# Patient Record
Sex: MALE | Race: WHITE | NOT HISPANIC OR LATINO | Employment: OTHER | URBAN - METROPOLITAN AREA
[De-identification: names, ages, dates, MRNs, and addresses within clinical notes are randomized per-mention and may not be internally consistent; named-entity substitution may affect disease eponyms.]

---

## 2023-10-18 ENCOUNTER — OFFICE VISIT (OUTPATIENT)
Dept: URGENT CARE | Facility: CLINIC | Age: 43
End: 2023-10-18
Payer: OTHER GOVERNMENT

## 2023-10-18 ENCOUNTER — OCCUPATIONAL HEALTH (OUTPATIENT)
Dept: URGENT CARE | Facility: CLINIC | Age: 43
End: 2023-10-18
Payer: OTHER GOVERNMENT

## 2023-10-18 VITALS
DIASTOLIC BLOOD PRESSURE: 84 MMHG | OXYGEN SATURATION: 97 % | BODY MASS INDEX: 36.26 KG/M2 | HEART RATE: 92 BPM | SYSTOLIC BLOOD PRESSURE: 138 MMHG | WEIGHT: 259 LBS | RESPIRATION RATE: 18 BRPM | HEIGHT: 71 IN | TEMPERATURE: 97 F

## 2023-10-18 DIAGNOSIS — J02.0 STREP PHARYNGITIS: Primary | ICD-10-CM

## 2023-10-18 DIAGNOSIS — Z00.00 ENCOUNTER FOR PHYSICAL EXAMINATION: Primary | ICD-10-CM

## 2023-10-18 DIAGNOSIS — J02.9 SORE THROAT: ICD-10-CM

## 2023-10-18 LAB
COLLECTION ONLY: NORMAL
CTP QC/QA: YES
CTP QC/QA: YES
S PYO RRNA THROAT QL PROBE: POSITIVE
SARS-COV-2 AG RESP QL IA.RAPID: NEGATIVE

## 2023-10-18 PROCEDURE — 87811 SARS-COV-2 COVID19 W/OPTIC: CPT | Mod: QW,S$GLB,, | Performed by: NURSE PRACTITIONER

## 2023-10-18 PROCEDURE — 99499 UNLISTED E&M SERVICE: CPT | Mod: S$GLB,,, | Performed by: NURSE PRACTITIONER

## 2023-10-18 PROCEDURE — 87880 POCT RAPID STREP A: ICD-10-PCS | Mod: QW,,, | Performed by: NURSE PRACTITIONER

## 2023-10-18 PROCEDURE — 87880 STREP A ASSAY W/OPTIC: CPT | Mod: QW,,, | Performed by: NURSE PRACTITIONER

## 2023-10-18 PROCEDURE — 99499 DOT PHYSICAL: ICD-10-PCS | Mod: S$GLB,,, | Performed by: NURSE PRACTITIONER

## 2023-10-18 PROCEDURE — 99204 OFFICE O/P NEW MOD 45 MIN: CPT | Mod: S$GLB,,, | Performed by: NURSE PRACTITIONER

## 2023-10-18 PROCEDURE — 87811 SARS CORONAVIRUS 2 ANTIGEN POCT, MANUAL READ: ICD-10-PCS | Mod: QW,S$GLB,, | Performed by: NURSE PRACTITIONER

## 2023-10-18 PROCEDURE — 99204 PR OFFICE/OUTPT VISIT, NEW, LEVL IV, 45-59 MIN: ICD-10-PCS | Mod: S$GLB,,, | Performed by: NURSE PRACTITIONER

## 2023-10-18 RX ORDER — PENICILLIN V POTASSIUM 500 MG/1
500 TABLET, FILM COATED ORAL EVERY 12 HOURS
Qty: 20 TABLET | Refills: 0 | Status: SHIPPED | OUTPATIENT
Start: 2023-10-18 | End: 2023-10-28

## 2023-10-18 NOTE — PROGRESS NOTES
"Subjective:      Patient ID: Max Flores is a 43 y.o. male.    Vitals:  height is 5' 11" (1.803 m) and weight is 117.5 kg (259 lb). His oral temperature is 97.1 °F (36.2 °C). His blood pressure is 138/84 and his pulse is 92. His respiration is 18 and oxygen saturation is 97%.     Chief Complaint: Sore Throat    Sore Throat   The current episode started yesterday. There has been no fever. The pain is at a severity of 7/10. The pain is moderate. Associated symptoms include congestion and a hoarse voice. Pertinent negatives include no abdominal pain, coughing, diarrhea, drooling, ear discharge, ear pain, headaches, plugged ear sensation, neck pain, shortness of breath, stridor, swollen glands, trouble swallowing or vomiting. He has had exposure to strep. He has had no exposure to mono. He has tried nothing for the symptoms. The treatment provided no relief.       HENT:  Positive for congestion and sore throat. Negative for ear pain, ear discharge, drooling and trouble swallowing.    Neck: Negative for neck pain.   Respiratory:  Negative for cough, shortness of breath and stridor.    Gastrointestinal:  Negative for abdominal pain, vomiting and diarrhea.   Neurological:  Negative for headaches.      Objective:     Physical Exam   Constitutional:  Non-toxic appearance. He does not appear ill. No distress.   HENT:   Head: Normocephalic and atraumatic.   Ears:   Right Ear: External ear normal.   Left Ear: External ear normal.   Mouth/Throat: Uvula is midline. Mucous membranes are moist. No uvula swelling. Posterior oropharyngeal erythema present. Tonsils are 0 on the right. Tonsils are 0 on the left. Oropharynx is clear.   No airway obstruction  No drooling or pooling of secretion      Comments: No airway obstruction  No drooling or pooling of secretion  Eyes: Extraocular movement intact   Pulmonary/Chest: Effort normal.   Abdominal: Normal appearance.   Neurological: He is alert.   Skin: Skin is not diaphoretic. " Capillary refill takes less than 2 seconds.   Nursing note and vitals reviewed.    Results for orders placed or performed in visit on 10/18/23   SARS Coronavirus 2 Antigen, POCT Manual Read   Result Value Ref Range    SARS Coronavirus 2 Antigen Negative Negative     Acceptable Yes    POCT rapid strep A   Result Value Ref Range    Rapid Strep A Screen Positive (A) Negative     Acceptable Yes       Assessment:           1. Strep pharyngitis    2. Sore throat        Plan:   Strep A positive   Covid negative  Antibiotics prescribed; take as discussed  Ibuprofen/Tylenol for pain  Strep pharyngitis  -     penicillin v potassium (VEETID) 500 MG tablet; Take 1 tablet (500 mg total) by mouth every 12 (twelve) hours. for 10 days  Dispense: 20 tablet; Refill: 0    Sore throat  -     SARS Coronavirus 2 Antigen, POCT Manual Read  -     POCT rapid strep A                Patient Instructions                                                         Pharyngitis   If your condition worsens or fails to improve we recommend that you receive another evaluation at the ER immediately or contact your PCP to discuss your concerns or return here. You must understand that you've received an urgent care treatment only and that you may be released before all your medical problems are known or treated. You the patient will arrange for followup care as instructed.   The majority of all sore throats or tonsillitis are viral and antibiotics will not treat this.     If the strep test performed in office was Positive:  - Complete the full course of antibiotics given  - Drink plenty of cool liquids while avoid any beverage or food that can irritate your            throat (acidic, spicy or salty foods).  - Throw away your toothbrush now and when you complete your antibiotics.    If the strep culture was done and returns negative in 3-5 days and you are still having a sore throat, you may need to get a mono spot test done or  repeated.   Tylenol or ibuprofen for pain may help as long as you are not allergic to these meds or have a medical condition such as stomach ulcers, liver or kidney disease or taking blood thinners etc that would   prevent you from using these medications.   Rest and fluids will help as well.   If you were prescribed antibiotics and are female and on BCP use additional methods to prevent pregnancy while on the antibiotics and for one cycle after.

## 2023-12-04 ENCOUNTER — ANESTHESIA (OUTPATIENT)
Dept: SURGERY | Facility: HOSPITAL | Age: 43
End: 2023-12-04
Payer: OTHER GOVERNMENT

## 2023-12-04 ENCOUNTER — HOSPITAL ENCOUNTER (OUTPATIENT)
Facility: HOSPITAL | Age: 43
Discharge: HOME | End: 2023-12-05
Attending: EMERGENCY MEDICINE | Admitting: INTERNAL MEDICINE
Payer: OTHER GOVERNMENT

## 2023-12-04 ENCOUNTER — ANESTHESIA EVENT (OUTPATIENT)
Dept: SURGERY | Facility: HOSPITAL | Age: 43
End: 2023-12-04
Payer: OTHER GOVERNMENT

## 2023-12-04 ENCOUNTER — TELEPHONE (OUTPATIENT)
Dept: BARIATRICS | Facility: CLINIC | Age: 43
End: 2023-12-04
Payer: OTHER GOVERNMENT

## 2023-12-04 DIAGNOSIS — R10.11 RIGHT UPPER QUADRANT ABDOMINAL PAIN: ICD-10-CM

## 2023-12-04 DIAGNOSIS — R10.13 EPIGASTRIC PAIN: Primary | ICD-10-CM

## 2023-12-04 DIAGNOSIS — K80.00 CALCULUS OF GALLBLADDER WITH ACUTE CHOLECYSTITIS WITHOUT OBSTRUCTION: ICD-10-CM

## 2023-12-04 DIAGNOSIS — K80.20 CHOLELITHIASIS: ICD-10-CM

## 2023-12-04 DIAGNOSIS — R07.9 CHEST PAIN: ICD-10-CM

## 2023-12-04 PROBLEM — E78.5 HYPERLIPIDEMIA: Status: ACTIVE | Noted: 2023-12-04

## 2023-12-04 LAB
ALBUMIN SERPL BCP-MCNC: 4.1 G/DL (ref 3.5–5.2)
ALP SERPL-CCNC: 38 U/L (ref 55–135)
ALT SERPL W/O P-5'-P-CCNC: 49 U/L (ref 10–44)
ANION GAP SERPL CALC-SCNC: 12 MMOL/L (ref 8–16)
AST SERPL-CCNC: 31 U/L (ref 10–40)
BASOPHILS # BLD AUTO: 0.06 K/UL (ref 0–0.2)
BASOPHILS NFR BLD: 0.5 % (ref 0–1.9)
BILIRUB SERPL-MCNC: 0.4 MG/DL (ref 0.1–1)
BILIRUB UR QL STRIP: NEGATIVE
BUN SERPL-MCNC: 12 MG/DL (ref 6–20)
CALCIUM SERPL-MCNC: 8.9 MG/DL (ref 8.7–10.5)
CHLORIDE SERPL-SCNC: 101 MMOL/L (ref 95–110)
CLARITY UR: ABNORMAL
CO2 SERPL-SCNC: 26 MMOL/L (ref 23–29)
COLOR UR: YELLOW
CREAT SERPL-MCNC: 1 MG/DL (ref 0.5–1.4)
DIFFERENTIAL METHOD: ABNORMAL
EOSINOPHIL # BLD AUTO: 0.1 K/UL (ref 0–0.5)
EOSINOPHIL NFR BLD: 0.8 % (ref 0–8)
ERYTHROCYTE [DISTWIDTH] IN BLOOD BY AUTOMATED COUNT: 13 % (ref 11.5–14.5)
EST. GFR  (NO RACE VARIABLE): >60 ML/MIN/1.73 M^2
GLUCOSE SERPL-MCNC: 153 MG/DL (ref 70–110)
GLUCOSE UR QL STRIP: ABNORMAL
HCT VFR BLD AUTO: 43.2 % (ref 40–54)
HGB BLD-MCNC: 14.8 G/DL (ref 14–18)
HGB UR QL STRIP: NEGATIVE
IMM GRANULOCYTES # BLD AUTO: 0.06 K/UL (ref 0–0.04)
IMM GRANULOCYTES NFR BLD AUTO: 0.5 % (ref 0–0.5)
KETONES UR QL STRIP: NEGATIVE
LEUKOCYTE ESTERASE UR QL STRIP: NEGATIVE
LIPASE SERPL-CCNC: 19 U/L (ref 4–60)
LYMPHOCYTES # BLD AUTO: 2 K/UL (ref 1–4.8)
LYMPHOCYTES NFR BLD: 18.2 % (ref 18–48)
MCH RBC QN AUTO: 30.3 PG (ref 27–31)
MCHC RBC AUTO-ENTMCNC: 34.3 G/DL (ref 32–36)
MCV RBC AUTO: 88 FL (ref 82–98)
MONOCYTES # BLD AUTO: 0.9 K/UL (ref 0.3–1)
MONOCYTES NFR BLD: 8.2 % (ref 4–15)
NEUTROPHILS # BLD AUTO: 8 K/UL (ref 1.8–7.7)
NEUTROPHILS NFR BLD: 71.8 % (ref 38–73)
NITRITE UR QL STRIP: NEGATIVE
NRBC BLD-RTO: 0 /100 WBC
PH UR STRIP: 7 [PH] (ref 5–8)
PLATELET # BLD AUTO: 253 K/UL (ref 150–450)
PMV BLD AUTO: 8.9 FL (ref 9.2–12.9)
POTASSIUM SERPL-SCNC: 3.6 MMOL/L (ref 3.5–5.1)
PROT SERPL-MCNC: 7.2 G/DL (ref 6–8.4)
PROT UR QL STRIP: NEGATIVE
RBC # BLD AUTO: 4.89 M/UL (ref 4.6–6.2)
SODIUM SERPL-SCNC: 139 MMOL/L (ref 136–145)
SP GR UR STRIP: 1.02 (ref 1–1.03)
URN SPEC COLLECT METH UR: ABNORMAL
UROBILINOGEN UR STRIP-ACNC: NEGATIVE EU/DL
WBC # BLD AUTO: 11.12 K/UL (ref 3.9–12.7)

## 2023-12-04 PROCEDURE — 37000009 HC ANESTHESIA EA ADD 15 MINS: Performed by: SURGERY

## 2023-12-04 PROCEDURE — 71000033 HC RECOVERY, INTIAL HOUR: Performed by: SURGERY

## 2023-12-04 PROCEDURE — 96375 TX/PRO/DX INJ NEW DRUG ADDON: CPT

## 2023-12-04 PROCEDURE — 81003 URINALYSIS AUTO W/O SCOPE: CPT | Performed by: EMERGENCY MEDICINE

## 2023-12-04 PROCEDURE — 27000221 HC OXYGEN, UP TO 24 HOURS

## 2023-12-04 PROCEDURE — 63600175 PHARM REV CODE 636 W HCPCS: Performed by: SURGERY

## 2023-12-04 PROCEDURE — 96365 THER/PROPH/DIAG IV INF INIT: CPT | Mod: 59

## 2023-12-04 PROCEDURE — 96376 TX/PRO/DX INJ SAME DRUG ADON: CPT | Mod: 59

## 2023-12-04 PROCEDURE — 94799 UNLISTED PULMONARY SVC/PX: CPT | Mod: XB

## 2023-12-04 PROCEDURE — 63600175 PHARM REV CODE 636 W HCPCS: Performed by: NURSE ANESTHETIST, CERTIFIED REGISTERED

## 2023-12-04 PROCEDURE — 99285 EMERGENCY DEPT VISIT HI MDM: CPT | Mod: 25

## 2023-12-04 PROCEDURE — 36415 COLL VENOUS BLD VENIPUNCTURE: CPT | Performed by: EMERGENCY MEDICINE

## 2023-12-04 PROCEDURE — 99205 PR OFFICE/OUTPT VISIT, NEW, LEVL V, 60-74 MIN: ICD-10-PCS | Mod: 57,,, | Performed by: SURGERY

## 2023-12-04 PROCEDURE — 63600175 PHARM REV CODE 636 W HCPCS: Performed by: NURSE PRACTITIONER

## 2023-12-04 PROCEDURE — 63600175 PHARM REV CODE 636 W HCPCS

## 2023-12-04 PROCEDURE — C9113 INJ PANTOPRAZOLE SODIUM, VIA: HCPCS | Performed by: NURSE PRACTITIONER

## 2023-12-04 PROCEDURE — 25000003 PHARM REV CODE 250: Performed by: NURSE ANESTHETIST, CERTIFIED REGISTERED

## 2023-12-04 PROCEDURE — 99205 OFFICE O/P NEW HI 60 MIN: CPT | Mod: 57,,, | Performed by: SURGERY

## 2023-12-04 PROCEDURE — 94761 N-INVAS EAR/PLS OXIMETRY MLT: CPT

## 2023-12-04 PROCEDURE — D9220A PRA ANESTHESIA: ICD-10-PCS | Mod: CRNA,,, | Performed by: NURSE ANESTHETIST, CERTIFIED REGISTERED

## 2023-12-04 PROCEDURE — 36000708 HC OR TIME LEV III 1ST 15 MIN: Performed by: SURGERY

## 2023-12-04 PROCEDURE — 25000003 PHARM REV CODE 250

## 2023-12-04 PROCEDURE — 27201423 OPTIME MED/SURG SUP & DEVICES STERILE SUPPLY: Performed by: SURGERY

## 2023-12-04 PROCEDURE — 25000003 PHARM REV CODE 250: Performed by: SURGERY

## 2023-12-04 PROCEDURE — 25000003 PHARM REV CODE 250: Performed by: ANESTHESIOLOGY

## 2023-12-04 PROCEDURE — 80053 COMPREHEN METABOLIC PANEL: CPT | Performed by: EMERGENCY MEDICINE

## 2023-12-04 PROCEDURE — 36000709 HC OR TIME LEV III EA ADD 15 MIN: Performed by: SURGERY

## 2023-12-04 PROCEDURE — D9220A PRA ANESTHESIA: ICD-10-PCS | Mod: ANES,,, | Performed by: ANESTHESIOLOGY

## 2023-12-04 PROCEDURE — G0378 HOSPITAL OBSERVATION PER HR: HCPCS

## 2023-12-04 PROCEDURE — 96375 TX/PRO/DX INJ NEW DRUG ADDON: CPT | Mod: 59

## 2023-12-04 PROCEDURE — 85025 COMPLETE CBC W/AUTO DIFF WBC: CPT | Performed by: EMERGENCY MEDICINE

## 2023-12-04 PROCEDURE — 96366 THER/PROPH/DIAG IV INF ADDON: CPT | Mod: 59

## 2023-12-04 PROCEDURE — 47562 LAPAROSCOPIC CHOLECYSTECTOMY: CPT | Mod: ,,, | Performed by: SURGERY

## 2023-12-04 PROCEDURE — 63600175 PHARM REV CODE 636 W HCPCS: Performed by: EMERGENCY MEDICINE

## 2023-12-04 PROCEDURE — D9220A PRA ANESTHESIA: Mod: CRNA,,, | Performed by: NURSE ANESTHETIST, CERTIFIED REGISTERED

## 2023-12-04 PROCEDURE — 47562 PR LAP,CHOLECYSTECTOMY: ICD-10-PCS | Mod: ,,, | Performed by: SURGERY

## 2023-12-04 PROCEDURE — 63600175 PHARM REV CODE 636 W HCPCS: Performed by: ANESTHESIOLOGY

## 2023-12-04 PROCEDURE — 83690 ASSAY OF LIPASE: CPT | Performed by: EMERGENCY MEDICINE

## 2023-12-04 PROCEDURE — D9220A PRA ANESTHESIA: Mod: ANES,,, | Performed by: ANESTHESIOLOGY

## 2023-12-04 PROCEDURE — 25500020 PHARM REV CODE 255

## 2023-12-04 PROCEDURE — 37000008 HC ANESTHESIA 1ST 15 MINUTES: Performed by: SURGERY

## 2023-12-04 RX ORDER — HYDROCODONE BITARTRATE AND ACETAMINOPHEN 5; 325 MG/1; MG/1
2 TABLET ORAL EVERY 6 HOURS PRN
Status: DISCONTINUED | OUTPATIENT
Start: 2023-12-04 | End: 2023-12-05 | Stop reason: HOSPADM

## 2023-12-04 RX ORDER — FENTANYL CITRATE 50 UG/ML
25 INJECTION, SOLUTION INTRAMUSCULAR; INTRAVENOUS EVERY 5 MIN PRN
Status: DISCONTINUED | OUTPATIENT
Start: 2023-12-04 | End: 2023-12-04 | Stop reason: HOSPADM

## 2023-12-04 RX ORDER — HYDROMORPHONE HYDROCHLORIDE 1 MG/ML
1 INJECTION, SOLUTION INTRAMUSCULAR; INTRAVENOUS; SUBCUTANEOUS EVERY 4 HOURS PRN
Status: DISCONTINUED | OUTPATIENT
Start: 2023-12-04 | End: 2023-12-04

## 2023-12-04 RX ORDER — OXYCODONE HYDROCHLORIDE 5 MG/1
5 TABLET ORAL
Status: DISCONTINUED | OUTPATIENT
Start: 2023-12-04 | End: 2023-12-04 | Stop reason: HOSPADM

## 2023-12-04 RX ORDER — LIDOCAINE HYDROCHLORIDE 20 MG/ML
INJECTION INTRAVENOUS
Status: DISCONTINUED | OUTPATIENT
Start: 2023-12-04 | End: 2023-12-04

## 2023-12-04 RX ORDER — LANOLIN ALCOHOL/MO/W.PET/CERES
800 CREAM (GRAM) TOPICAL
Status: DISCONTINUED | OUTPATIENT
Start: 2023-12-04 | End: 2023-12-05 | Stop reason: HOSPADM

## 2023-12-04 RX ORDER — HYDROMORPHONE HYDROCHLORIDE 1 MG/ML
0.5 INJECTION, SOLUTION INTRAMUSCULAR; INTRAVENOUS; SUBCUTANEOUS EVERY 4 HOURS PRN
Status: DISCONTINUED | OUTPATIENT
Start: 2023-12-04 | End: 2023-12-04

## 2023-12-04 RX ORDER — FENTANYL CITRATE 50 UG/ML
INJECTION, SOLUTION INTRAMUSCULAR; INTRAVENOUS
Status: DISCONTINUED | OUTPATIENT
Start: 2023-12-04 | End: 2023-12-04

## 2023-12-04 RX ORDER — SODIUM CHLORIDE 0.9 % (FLUSH) 0.9 %
3 SYRINGE (ML) INJECTION EVERY 12 HOURS PRN
Status: DISCONTINUED | OUTPATIENT
Start: 2023-12-04 | End: 2023-12-05 | Stop reason: HOSPADM

## 2023-12-04 RX ORDER — DROPERIDOL 2.5 MG/ML
1.25 INJECTION, SOLUTION INTRAMUSCULAR; INTRAVENOUS
Status: DISCONTINUED | OUTPATIENT
Start: 2023-12-04 | End: 2023-12-04

## 2023-12-04 RX ORDER — SUCCINYLCHOLINE CHLORIDE 20 MG/ML
INJECTION INTRAMUSCULAR; INTRAVENOUS
Status: DISCONTINUED | OUTPATIENT
Start: 2023-12-04 | End: 2023-12-04

## 2023-12-04 RX ORDER — TALC
9 POWDER (GRAM) TOPICAL NIGHTLY PRN
Status: DISCONTINUED | OUTPATIENT
Start: 2023-12-04 | End: 2023-12-05 | Stop reason: HOSPADM

## 2023-12-04 RX ORDER — HYDROMORPHONE HYDROCHLORIDE 1 MG/ML
0.5 INJECTION, SOLUTION INTRAMUSCULAR; INTRAVENOUS; SUBCUTANEOUS
Status: COMPLETED | OUTPATIENT
Start: 2023-12-04 | End: 2023-12-04

## 2023-12-04 RX ORDER — DROPERIDOL 2.5 MG/ML
2.5 INJECTION, SOLUTION INTRAMUSCULAR; INTRAVENOUS
Status: DISCONTINUED | OUTPATIENT
Start: 2023-12-04 | End: 2023-12-04

## 2023-12-04 RX ORDER — ONDANSETRON 2 MG/ML
INJECTION INTRAMUSCULAR; INTRAVENOUS
Status: DISCONTINUED | OUTPATIENT
Start: 2023-12-04 | End: 2023-12-04

## 2023-12-04 RX ORDER — ENOXAPARIN SODIUM 100 MG/ML
40 INJECTION SUBCUTANEOUS EVERY 24 HOURS
Status: DISCONTINUED | OUTPATIENT
Start: 2023-12-04 | End: 2023-12-04

## 2023-12-04 RX ORDER — MAG HYDROX/ALUMINUM HYD/SIMETH 200-200-20
30 SUSPENSION, ORAL (FINAL DOSE FORM) ORAL 4 TIMES DAILY PRN
Status: DISCONTINUED | OUTPATIENT
Start: 2023-12-04 | End: 2023-12-05 | Stop reason: HOSPADM

## 2023-12-04 RX ORDER — PROPOFOL 10 MG/ML
VIAL (ML) INTRAVENOUS
Status: DISCONTINUED | OUTPATIENT
Start: 2023-12-04 | End: 2023-12-04

## 2023-12-04 RX ORDER — IBUPROFEN 200 MG
24 TABLET ORAL
Status: DISCONTINUED | OUTPATIENT
Start: 2023-12-04 | End: 2023-12-05 | Stop reason: HOSPADM

## 2023-12-04 RX ORDER — HYDROMORPHONE HYDROCHLORIDE 1 MG/ML
1 INJECTION, SOLUTION INTRAMUSCULAR; INTRAVENOUS; SUBCUTANEOUS
Status: COMPLETED | OUTPATIENT
Start: 2023-12-04 | End: 2023-12-04

## 2023-12-04 RX ORDER — HYDROMORPHONE HYDROCHLORIDE 2 MG/ML
0.2 INJECTION, SOLUTION INTRAMUSCULAR; INTRAVENOUS; SUBCUTANEOUS EVERY 5 MIN PRN
Status: DISCONTINUED | OUTPATIENT
Start: 2023-12-04 | End: 2023-12-04 | Stop reason: HOSPADM

## 2023-12-04 RX ORDER — NALOXONE HCL 0.4 MG/ML
0.02 VIAL (ML) INJECTION
Status: DISCONTINUED | OUTPATIENT
Start: 2023-12-04 | End: 2023-12-05 | Stop reason: HOSPADM

## 2023-12-04 RX ORDER — HYDROCODONE BITARTRATE AND ACETAMINOPHEN 10; 325 MG/1; MG/1
1 TABLET ORAL EVERY 6 HOURS PRN
Status: DISCONTINUED | OUTPATIENT
Start: 2023-12-04 | End: 2023-12-05 | Stop reason: HOSPADM

## 2023-12-04 RX ORDER — DICLOFENAC SODIUM 10 MG/G
GEL TOPICAL
COMMUNITY
Start: 2023-10-30 | End: 2023-12-04

## 2023-12-04 RX ORDER — POLYETHYLENE GLYCOL 3350 17 G/17G
17 POWDER, FOR SOLUTION ORAL DAILY
Status: DISCONTINUED | OUTPATIENT
Start: 2023-12-04 | End: 2023-12-05 | Stop reason: HOSPADM

## 2023-12-04 RX ORDER — ONDANSETRON 2 MG/ML
4 INJECTION INTRAMUSCULAR; INTRAVENOUS
Status: COMPLETED | OUTPATIENT
Start: 2023-12-04 | End: 2023-12-04

## 2023-12-04 RX ORDER — SODIUM,POTASSIUM PHOSPHATES 280-250MG
2 POWDER IN PACKET (EA) ORAL
Status: DISCONTINUED | OUTPATIENT
Start: 2023-12-04 | End: 2023-12-05 | Stop reason: HOSPADM

## 2023-12-04 RX ORDER — KETOROLAC TROMETHAMINE 30 MG/ML
INJECTION, SOLUTION INTRAMUSCULAR; INTRAVENOUS
Status: DISCONTINUED | OUTPATIENT
Start: 2023-12-04 | End: 2023-12-04

## 2023-12-04 RX ORDER — ACETAMINOPHEN 325 MG/1
650 TABLET ORAL EVERY 4 HOURS PRN
Status: DISCONTINUED | OUTPATIENT
Start: 2023-12-04 | End: 2023-12-04

## 2023-12-04 RX ORDER — ACETAMINOPHEN 10 MG/ML
INJECTION, SOLUTION INTRAVENOUS
Status: DISCONTINUED | OUTPATIENT
Start: 2023-12-04 | End: 2023-12-04

## 2023-12-04 RX ORDER — GLUCAGON 1 MG
1 KIT INJECTION
Status: DISCONTINUED | OUTPATIENT
Start: 2023-12-04 | End: 2023-12-05 | Stop reason: HOSPADM

## 2023-12-04 RX ORDER — SIMETHICONE 80 MG
1 TABLET,CHEWABLE ORAL 4 TIMES DAILY PRN
Status: DISCONTINUED | OUTPATIENT
Start: 2023-12-04 | End: 2023-12-05 | Stop reason: HOSPADM

## 2023-12-04 RX ORDER — NEOSTIGMINE METHYLSULFATE 1 MG/ML
INJECTION, SOLUTION INTRAVENOUS
Status: DISCONTINUED | OUTPATIENT
Start: 2023-12-04 | End: 2023-12-04

## 2023-12-04 RX ORDER — ONDANSETRON 2 MG/ML
8 INJECTION INTRAMUSCULAR; INTRAVENOUS EVERY 6 HOURS PRN
Status: DISCONTINUED | OUTPATIENT
Start: 2023-12-04 | End: 2023-12-05 | Stop reason: HOSPADM

## 2023-12-04 RX ORDER — METOCLOPRAMIDE HYDROCHLORIDE 5 MG/ML
10 INJECTION INTRAMUSCULAR; INTRAVENOUS EVERY 10 MIN PRN
Status: COMPLETED | OUTPATIENT
Start: 2023-12-04 | End: 2023-12-04

## 2023-12-04 RX ORDER — IBUPROFEN 200 MG
16 TABLET ORAL
Status: DISCONTINUED | OUTPATIENT
Start: 2023-12-04 | End: 2023-12-05 | Stop reason: HOSPADM

## 2023-12-04 RX ORDER — ROCURONIUM BROMIDE 10 MG/ML
INJECTION, SOLUTION INTRAVENOUS
Status: DISCONTINUED | OUTPATIENT
Start: 2023-12-04 | End: 2023-12-04

## 2023-12-04 RX ORDER — KETOROLAC TROMETHAMINE 30 MG/ML
15 INJECTION, SOLUTION INTRAMUSCULAR; INTRAVENOUS
Status: COMPLETED | OUTPATIENT
Start: 2023-12-04 | End: 2023-12-04

## 2023-12-04 RX ORDER — DEXAMETHASONE SODIUM PHOSPHATE 4 MG/ML
INJECTION, SOLUTION INTRA-ARTICULAR; INTRALESIONAL; INTRAMUSCULAR; INTRAVENOUS; SOFT TISSUE
Status: DISCONTINUED | OUTPATIENT
Start: 2023-12-04 | End: 2023-12-04

## 2023-12-04 RX ORDER — SODIUM CHLORIDE, SODIUM LACTATE, POTASSIUM CHLORIDE, CALCIUM CHLORIDE 600; 310; 30; 20 MG/100ML; MG/100ML; MG/100ML; MG/100ML
INJECTION, SOLUTION INTRAVENOUS CONTINUOUS
Status: DISCONTINUED | OUTPATIENT
Start: 2023-12-04 | End: 2023-12-04

## 2023-12-04 RX ORDER — MIDAZOLAM HYDROCHLORIDE 1 MG/ML
INJECTION INTRAMUSCULAR; INTRAVENOUS
Status: DISCONTINUED | OUTPATIENT
Start: 2023-12-04 | End: 2023-12-04

## 2023-12-04 RX ORDER — HYDROCODONE BITARTRATE AND ACETAMINOPHEN 5; 325 MG/1; MG/1
1 TABLET ORAL EVERY 6 HOURS PRN
Status: DISCONTINUED | OUTPATIENT
Start: 2023-12-04 | End: 2023-12-04

## 2023-12-04 RX ORDER — PANTOPRAZOLE SODIUM 40 MG/10ML
40 INJECTION, POWDER, LYOPHILIZED, FOR SOLUTION INTRAVENOUS DAILY
Status: DISCONTINUED | OUTPATIENT
Start: 2023-12-04 | End: 2023-12-05 | Stop reason: HOSPADM

## 2023-12-04 RX ORDER — ACETAMINOPHEN 325 MG/1
650 TABLET ORAL EVERY 8 HOURS PRN
Status: DISCONTINUED | OUTPATIENT
Start: 2023-12-04 | End: 2023-12-04

## 2023-12-04 RX ORDER — BUPIVACAINE HYDROCHLORIDE 2.5 MG/ML
INJECTION, SOLUTION EPIDURAL; INFILTRATION; INTRACAUDAL
Status: DISCONTINUED | OUTPATIENT
Start: 2023-12-04 | End: 2023-12-04 | Stop reason: HOSPADM

## 2023-12-04 RX ADMIN — PROPOFOL 200 MG: 10 INJECTION, EMULSION INTRAVENOUS at 12:12

## 2023-12-04 RX ADMIN — ACETAMINOPHEN 1000 MG: 10 INJECTION, SOLUTION INTRAVENOUS at 12:12

## 2023-12-04 RX ADMIN — PIPERACILLIN AND TAZOBACTAM 3.38 G: 3; .375 INJECTION, POWDER, LYOPHILIZED, FOR SOLUTION INTRAVENOUS; PARENTERAL at 10:12

## 2023-12-04 RX ADMIN — HYDROMORPHONE HYDROCHLORIDE 1 MG: 1 INJECTION, SOLUTION INTRAMUSCULAR; INTRAVENOUS; SUBCUTANEOUS at 09:12

## 2023-12-04 RX ADMIN — ROCURONIUM BROMIDE 10 MG: 10 INJECTION, SOLUTION INTRAVENOUS at 12:12

## 2023-12-04 RX ADMIN — ONDANSETRON 4 MG: 2 INJECTION INTRAMUSCULAR; INTRAVENOUS at 12:12

## 2023-12-04 RX ADMIN — GLYCOPYRROLATE 0.4 MG: 0.2 INJECTION, SOLUTION INTRAMUSCULAR; INTRAVENOUS at 01:12

## 2023-12-04 RX ADMIN — ROCURONIUM BROMIDE 30 MG: 10 INJECTION, SOLUTION INTRAVENOUS at 12:12

## 2023-12-04 RX ADMIN — KETOROLAC TROMETHAMINE 15 MG: 30 INJECTION, SOLUTION INTRAMUSCULAR; INTRAVENOUS at 01:12

## 2023-12-04 RX ADMIN — NEOSTIGMINE METHYLSULFATE 3 MG: 1 INJECTION INTRAVENOUS at 01:12

## 2023-12-04 RX ADMIN — SODIUM CHLORIDE, SODIUM GLUCONATE, SODIUM ACETATE, POTASSIUM CHLORIDE AND MAGNESIUM CHLORIDE: 526; 502; 368; 37; 30 INJECTION, SOLUTION INTRAVENOUS at 12:12

## 2023-12-04 RX ADMIN — HYDROCODONE BITARTRATE AND ACETAMINOPHEN 2 TABLET: 5; 325 TABLET ORAL at 10:12

## 2023-12-04 RX ADMIN — HYDROMORPHONE HYDROCHLORIDE 1 MG: 1 INJECTION, SOLUTION INTRAMUSCULAR; INTRAVENOUS; SUBCUTANEOUS at 04:12

## 2023-12-04 RX ADMIN — SUCCINYLCHOLINE CHLORIDE 200 MG: 20 INJECTION, SOLUTION INTRAMUSCULAR; INTRAVENOUS at 12:12

## 2023-12-04 RX ADMIN — ONDANSETRON 4 MG: 2 INJECTION INTRAMUSCULAR; INTRAVENOUS at 01:12

## 2023-12-04 RX ADMIN — LIDOCAINE HYDROCHLORIDE 100 MG: 20 INJECTION, SOLUTION INTRAVENOUS at 12:12

## 2023-12-04 RX ADMIN — FENTANYL CITRATE 100 MCG: 50 INJECTION, SOLUTION INTRAMUSCULAR; INTRAVENOUS at 12:12

## 2023-12-04 RX ADMIN — IOHEXOL 100 ML: 350 INJECTION, SOLUTION INTRAVENOUS at 02:12

## 2023-12-04 RX ADMIN — PIPERACILLIN AND TAZOBACTAM 3.38 G: 3; .375 INJECTION, POWDER, LYOPHILIZED, FOR SOLUTION INTRAVENOUS; PARENTERAL at 06:12

## 2023-12-04 RX ADMIN — MIDAZOLAM HYDROCHLORIDE 2 MG: 1 INJECTION, SOLUTION INTRAMUSCULAR; INTRAVENOUS at 12:12

## 2023-12-04 RX ADMIN — METOCLOPRAMIDE 10 MG: 5 INJECTION, SOLUTION INTRAMUSCULAR; INTRAVENOUS at 01:12

## 2023-12-04 RX ADMIN — HYDROCODONE BITARTRATE AND ACETAMINOPHEN 1 TABLET: 10; 325 TABLET ORAL at 05:12

## 2023-12-04 RX ADMIN — DEXAMETHASONE SODIUM PHOSPHATE 8 MG: 4 INJECTION, SOLUTION INTRA-ARTICULAR; INTRALESIONAL; INTRAMUSCULAR; INTRAVENOUS; SOFT TISSUE at 12:12

## 2023-12-04 RX ADMIN — KETOROLAC TROMETHAMINE 30 MG: 30 INJECTION, SOLUTION INTRAMUSCULAR; INTRAVENOUS at 01:12

## 2023-12-04 RX ADMIN — OXYCODONE 5 MG: 5 TABLET ORAL at 02:12

## 2023-12-04 RX ADMIN — PANTOPRAZOLE SODIUM 40 MG: 40 INJECTION, POWDER, LYOPHILIZED, FOR SOLUTION INTRAVENOUS at 09:12

## 2023-12-04 RX ADMIN — HYDROMORPHONE HYDROCHLORIDE 0.5 MG: 0.5 INJECTION, SOLUTION INTRAMUSCULAR; INTRAVENOUS; SUBCUTANEOUS at 06:12

## 2023-12-04 RX ADMIN — SODIUM CHLORIDE, POTASSIUM CHLORIDE, SODIUM LACTATE AND CALCIUM CHLORIDE: 600; 310; 30; 20 INJECTION, SOLUTION INTRAVENOUS at 08:12

## 2023-12-04 NOTE — PLAN OF CARE
Patient prepared for surgery. Surgical and anesthesia consents signed. Patient belongings in preop. Text message notifications set up with spouse. Call light within reach, bed in lowest position. Patient prepared for surgery. Surgical and anesthesia consents signed. Patient belongings in preop. Text message notifications set up with spouse. Call light within reach, bed in lowest position.

## 2023-12-04 NOTE — NURSING
Arrives to room 314 Admission from ER VSS afebrile AAOX4 Independent with chair to bed transfer Skin WDI denies nausea at this time.

## 2023-12-04 NOTE — CONSULTS
[facility]/Surg  General Surgery  Consult Note    Patient Name: Max Flores  MRN: [id number]  Code Status: Full Code  Admission Date: 12/4/2023  Hospital Length of Stay: 0 days  Attending Physician: Sue Tong MD  Primary Care Provider: Leta Primary Doctor    Patient information was obtained from patient and ER records.     Inpatient consult to General Surgery  Consult performed by: Alicia Rothman MD  Consult ordered by: Nelli Denis FNP  Reason for consult: cholecystitis  Assessment/Recommendations: Javier saleem        Subjective:     Principal Problem: Cholelithiasis    History of Present Illness: 43-year-old male with epigastric right upper quadrant pain for about 24 hours post prandial.  Associated nausea vomiting, no prior episodes, pain radiates to back.    No current facility-administered medications on file prior to encounter.     Current Outpatient Medications on File Prior to Encounter   Medication Sig    [DISCONTINUED] diclofenac sodium (VOLTAREN) 1 % Gel APPLY 2 GRAMS TOPICALLY FOUR TIMES A DAY AS NEEDED FOR PAIN       Review of patient's allergies indicates:   Allergen Reactions    Morphine Hives and Rash       Past Medical History:   Diagnosis Date    Mixed hyperlipidemia      History reviewed. No pertinent surgical history.  Family History    None       Tobacco Use    Smoking status: Never    Smokeless tobacco: Never   Substance and Sexual Activity    Alcohol use: Not Currently    Drug use: Not on file    Sexual activity: Not on file     Review of Systems   Constitutional:  Negative for chills and fever.   HENT:  Negative for congestion and sore throat.    Eyes:  Negative for visual disturbance.   Respiratory:  Negative for cough and shortness of breath.    Cardiovascular:  Negative for chest pain and palpitations.   Gastrointestinal:  Positive for abdominal pain, nausea and vomiting. Negative for constipation and diarrhea.   Endocrine: Negative for cold intolerance and heat  intolerance.   Genitourinary:  Negative for dysuria and hematuria.   Musculoskeletal:  Negative for arthralgias and myalgias.   Skin:  Negative for rash.   Neurological:  Negative for tremors and seizures.   Hematological:  Negative for adenopathy. Does not bruise/bleed easily.   All other systems reviewed and are negative.    Objective:     Vital Signs (Most Recent):  Temp: 98.3 °F (36.8 °C) (12/04/23 0752)  Pulse: 67 (12/04/23 0752)  Resp: 18 (12/04/23 0752)  BP: (!) 143/79 (12/04/23 0752)  SpO2: 95 % (12/04/23 0752) Vital Signs (24h Range):  Temp:  [98.1 °F (36.7 °C)-98.3 °F (36.8 °C)] 98.3 °F (36.8 °C)  Pulse:  [63-77] 67  Resp:  [16-20] 18  SpO2:  [95 %-100 %] 95 %  BP: (130-159)/(68-94) 143/79     Weight: 121.6 kg (268 lb)  Body mass index is 36.35 kg/m².     Physical Exam  Vitals and nursing note reviewed.   Constitutional:       Appearance: Normal appearance. He is well-developed.   HENT:      Head: Normocephalic and atraumatic.      Nose: Nose normal. No septal deviation.   Eyes:      Conjunctiva/sclera: Conjunctivae normal.      Pupils: Pupils are equal, round, and reactive to light.   Neck:      Thyroid: No thyroid mass.      Vascular: No JVD.      Trachea: No tracheal tenderness or tracheal deviation.   Cardiovascular:      Rate and Rhythm: Normal rate and regular rhythm.      Heart sounds: S1 normal and S2 normal. No murmur heard.     No friction rub. No gallop.   Pulmonary:      Effort: Pulmonary effort is normal.      Breath sounds: Normal breath sounds. No decreased breath sounds, wheezing, rhonchi or rales.   Abdominal:      General: Abdomen is protuberant. Bowel sounds are normal. There is no distension.      Palpations: Abdomen is soft. There is no hepatomegaly, splenomegaly or mass.      Tenderness: There is abdominal tenderness in the right upper quadrant and epigastric area. There is guarding (ruq).   Skin:     General: Skin is warm.      Findings: No rash.   Neurological:      General: No  focal deficit present.      Mental Status: He is alert and oriented to person, place, and time.      Cranial Nerves: No cranial nerve deficit.      Sensory: No sensory deficit.   Psychiatric:         Mood and Affect: Mood normal.         Behavior: Behavior normal.            I have reviewed all pertinent lab results within the past 24 hours.  CBC:   Recent Labs   Lab 12/04/23 0127   WBC 11.12   RBC 4.89   HGB 14.8   HCT 43.2      MCV 88   MCH 30.3   MCHC 34.3     CMP:   Recent Labs   Lab 12/04/23 0127   *   CALCIUM 8.9   ALBUMIN 4.1   PROT 7.2      K 3.6   CO2 26      BUN 12   CREATININE 1.0   ALKPHOS 38*   ALT 49*   AST 31   BILITOT 0.4       Significant Diagnostics:  I have reviewed all pertinent imaging results/findings within the past 24 hours.  CT: I have reviewed all pertinent results/findings within the past 24 hours and my personal findings are:  gallstones    Assessment/Plan:     Calculus of gallbladder with acute cholecystitis without obstruction  43-year-old with signs and symptoms consistent with acute cholecystitis.  Gallstones on imaging.  Plan for laparoscopic cholecystectomy today.  I spoke with the primary team.      VTE Risk Mitigation (From admission, onward)           Ordered     enoxaparin injection 40 mg  Daily         12/04/23 0637     IP VTE HIGH RISK PATIENT  Once         12/04/23 0637     Place sequential compression device  Until discontinued         12/04/23 0637                    Thank you for your consult. I will follow-up with patient. Please contact us if you have any additional questions.    Alicia Rothman MD  General Surgery  [facility]/Surg

## 2023-12-04 NOTE — ASSESSMENT & PLAN NOTE
Admit to obs  CT shows cholelithiasis but no cholecystitis. US of abdomen also shows cholecystitis but no wall thickening. However patient is in a lot of pain and very tender to the RUQ and epigastric area, would like to discuss having his gall bladder removed while he is here.   IVF hydration  Pain meds PRN  Consult general surgery

## 2023-12-04 NOTE — SUBJECTIVE & OBJECTIVE
No current facility-administered medications on file prior to encounter.     Current Outpatient Medications on File Prior to Encounter   Medication Sig    [DISCONTINUED] diclofenac sodium (VOLTAREN) 1 % Gel APPLY 2 GRAMS TOPICALLY FOUR TIMES A DAY AS NEEDED FOR PAIN       Review of patient's allergies indicates:   Allergen Reactions    Morphine Hives and Rash       Past Medical History:   Diagnosis Date    Mixed hyperlipidemia      History reviewed. No pertinent surgical history.  Family History    None       Tobacco Use    Smoking status: Never    Smokeless tobacco: Never   Substance and Sexual Activity    Alcohol use: Not Currently    Drug use: Not on file    Sexual activity: Not on file     Review of Systems   Constitutional:  Negative for chills and fever.   HENT:  Negative for congestion and sore throat.    Eyes:  Negative for visual disturbance.   Respiratory:  Negative for cough and shortness of breath.    Cardiovascular:  Negative for chest pain and palpitations.   Gastrointestinal:  Positive for abdominal pain, nausea and vomiting. Negative for constipation and diarrhea.   Endocrine: Negative for cold intolerance and heat intolerance.   Genitourinary:  Negative for dysuria and hematuria.   Musculoskeletal:  Negative for arthralgias and myalgias.   Skin:  Negative for rash.   Neurological:  Negative for tremors and seizures.   Hematological:  Negative for adenopathy. Does not bruise/bleed easily.   All other systems reviewed and are negative.    Objective:     Vital Signs (Most Recent):  Temp: 98.3 °F (36.8 °C) (12/04/23 0752)  Pulse: 67 (12/04/23 0752)  Resp: 18 (12/04/23 0752)  BP: (!) 143/79 (12/04/23 0752)  SpO2: 95 % (12/04/23 0752) Vital Signs (24h Range):  Temp:  [98.1 °F (36.7 °C)-98.3 °F (36.8 °C)] 98.3 °F (36.8 °C)  Pulse:  [63-77] 67  Resp:  [16-20] 18  SpO2:  [95 %-100 %] 95 %  BP: (130-159)/(68-94) 143/79     Weight: 121.6 kg (268 lb)  Body mass index is 36.35 kg/m².     Physical Exam  Vitals  and nursing note reviewed.   Constitutional:       Appearance: Normal appearance. He is well-developed.   HENT:      Head: Normocephalic and atraumatic.      Nose: Nose normal. No septal deviation.   Eyes:      Conjunctiva/sclera: Conjunctivae normal.      Pupils: Pupils are equal, round, and reactive to light.   Neck:      Thyroid: No thyroid mass.      Vascular: No JVD.      Trachea: No tracheal tenderness or tracheal deviation.   Cardiovascular:      Rate and Rhythm: Normal rate and regular rhythm.      Heart sounds: S1 normal and S2 normal. No murmur heard.     No friction rub. No gallop.   Pulmonary:      Effort: Pulmonary effort is normal.      Breath sounds: Normal breath sounds. No decreased breath sounds, wheezing, rhonchi or rales.   Abdominal:      General: Abdomen is protuberant. Bowel sounds are normal. There is no distension.      Palpations: Abdomen is soft. There is no hepatomegaly, splenomegaly or mass.      Tenderness: There is abdominal tenderness in the right upper quadrant and epigastric area. There is guarding (ruq).   Skin:     General: Skin is warm.      Findings: No rash.   Neurological:      General: No focal deficit present.      Mental Status: He is alert and oriented to person, place, and time.      Cranial Nerves: No cranial nerve deficit.      Sensory: No sensory deficit.   Psychiatric:         Mood and Affect: Mood normal.         Behavior: Behavior normal.            I have reviewed all pertinent lab results within the past 24 hours.  CBC:   Recent Labs   Lab 12/04/23  0127   WBC 11.12   RBC 4.89   HGB 14.8   HCT 43.2      MCV 88   MCH 30.3   MCHC 34.3     CMP:   Recent Labs   Lab 12/04/23  0127   *   CALCIUM 8.9   ALBUMIN 4.1   PROT 7.2      K 3.6   CO2 26      BUN 12   CREATININE 1.0   ALKPHOS 38*   ALT 49*   AST 31   BILITOT 0.4       Significant Diagnostics:  I have reviewed all pertinent imaging results/findings within the past 24 hours.  CT: I have  reviewed all pertinent results/findings within the past 24 hours and my personal findings are:  gallstones

## 2023-12-04 NOTE — HPI
Max Flores is a 43 year old male with a past medical history of hyperlipidemia, not on medication, who presented to the ED with a complaint of abdominal pain. He reports sudden onset of RUQ and epigastric pain, with radiation to the back tonight, with associated nausea and vomiting. He initially thought he had a stomach bug but the pain persisted and he was unable to tolerate it. He denies fever, chills, urinary complaint or other complaint. ED work up included a  CBC, which was unremarkable, and CMP which shows an elevated blood sugar. Lipase is negative at 19. Urinalysis negative for evidence of infection. CT of the abdomen performed which showed cholelithiasis without evidence of cholecystitis. US of the abdomen performed which also showed gall stones and no wall thickening. Patient continued to have significant pain to the area. General Surgery consulted, Dr. Villalobos, who recommended admission for observation, and to consult the day provider to discuss possible surgery. Hospital Medicine consulted for admission and further management.

## 2023-12-04 NOTE — DISCHARGE INSTRUCTIONS
Discharge Instructions, [facility] Medicine    Thank you for choosing [facility] for your medical care. The primary doctor who is taking care of you at the time of your discharge is Sue Tong MD.     You were admitted to the hospital with Cholelithiasis.     Please note your discharge instructions, including diet/activity restrictions, follow-up appointments, and medication changes.  If you have any questions about your medical issues, prescriptions, or any other questions, please feel free to contact the [facility] Medicine Dept at [phone] and we will help.    If you are previously with Home health, outpatient PT/OT or under a therapy program, you are cleared to return to those programs.    Please direct all long term medication refills and follow up to your primary care provider, No, Primary Doctor. Thank you again for letting us take care of your health care needs.    Please note the following discharge instructions per your discharging physician-  Maria Eugenia Sharpe PA-C

## 2023-12-04 NOTE — ANESTHESIA PREPROCEDURE EVALUATION
12/04/2023  Max Flores is a 43 y.o., male.      Pre-op Assessment    I have reviewed the Patient Summary Reports.     I have reviewed the Nursing Notes. I have reviewed the NPO Status.   I have reviewed the Medications.     Review of Systems  Anesthesia Hx:             Denies Family Hx of Anesthesia complications.    Denies Personal Hx of Anesthesia complications.                    Pulmonary:        Sleep Apnea, CPAP                Hepatic/GI:        Acute  calculus cholecystitis          Endocrine:        Obesity / BMI > 30      Physical Exam  General: Well nourished, Cooperative, Alert and Oriented    Airway:  Mallampati: III / II  Mouth Opening: Normal  TM Distance: Normal  Tongue: Normal  Neck ROM: Normal ROM  Neck: Girth Increased    Dental:  Intact    Chest/Lungs:  Normal Respiratory Rate    Heart:  Rate: Normal  Rhythm: Regular Rhythm        Anesthesia Plan  Type of Anesthesia, risks & benefits discussed:    Anesthesia Type: Gen ETT  Intra-op Monitoring Plan: Standard ASA Monitors  Post Op Pain Control Plan: multimodal analgesia  Induction:  IV  Airway Plan: Video, Post-Induction  Informed Consent: Informed consent signed with the Patient and all parties understand the risks and agree with anesthesia plan.  All questions answered.   ASA Score: 3    Ready For Surgery From Anesthesia Perspective.     .

## 2023-12-04 NOTE — NURSING
Returns to room S/P lap Sheela per Dr Rothman VSS afebrile Denies pain 4 lap sites with Dermabond closure CDI no redness swelling or drainage noted at this time Spouse present at bedside.

## 2023-12-04 NOTE — H&P
[facility]  History & Physical    Patient Name: Max Flores  MRN: [id number]  Patient Class: OP- Observation  Admission Date: 12/4/2023  Attending Physician: Sue Tong MD   Primary Care Provider: [facility] Primary [facility]         Patient information was obtained from patient, past medical records, and ER records.     Subjective:     Principal Problem:Cholelithiasis    Chief Complaint:   Chief Complaint   Patient presents with    Abdominal Pain     To mid abdomen to back with vomiting        HPI: Max Flores is a 43 year old male with a past medical history of hyperlipidemia, not on medication, who presented to the ED with a complaint of abdominal pain. He reports sudden onset of RUQ and epigastric pain, with radiation to the back tonight, with associated nausea and vomiting. He initially thought he had a stomach bug but the pain persisted and he was unable to tolerate it. He denies fever, chills, urinary complaint or other complaint. ED work up included a  CBC, which was unremarkable, and CMP which shows an elevated blood sugar. Lipase is negative at 19. Urinalysis negative for evidence of infection. CT of the abdomen performed which showed cholelithiasis without evidence of cholecystitis. US of the abdomen performed which also showed gall stones and no wall thickening. Patient continued to have significant pain to the area. General Surgery consulted, Dr. Villalobos, who recommended admission for observation, and to consult the day provider to discuss possible surgery. Hospital Medicine consulted for admission and further management.     Past Medical History:   Diagnosis Date    Mixed hyperlipidemia        History reviewed. No pertinent surgical history.    Review of patient's allergies indicates:   Allergen Reactions    Morphine Hives and Rash       No current facility-administered medications on file prior to encounter.     Current Outpatient Medications on File Prior to  Encounter   Medication Sig    [DISCONTINUED] diclofenac sodium (VOLTAREN) 1 % Gel APPLY 2 GRAMS TOPICALLY FOUR TIMES A DAY AS NEEDED FOR PAIN     Family History    None       Tobacco Use    Smoking status: Never    Smokeless tobacco: Never   Substance and Sexual Activity    Alcohol use: Not Currently    Drug use: Not on file    Sexual activity: Not on file     Review of Systems   Constitutional:  Negative for chills and fever.   HENT:  Negative for congestion and sore throat.    Eyes:  Negative for visual disturbance.   Respiratory:  Negative for cough and shortness of breath.    Cardiovascular:  Negative for chest pain and palpitations.   Gastrointestinal:  Positive for abdominal pain, nausea and vomiting. Negative for constipation and diarrhea.   Endocrine: Negative for cold intolerance and heat intolerance.   Genitourinary:  Negative for dysuria and hematuria.   Musculoskeletal:  Negative for arthralgias and myalgias.   Skin:  Negative for rash.   Neurological:  Negative for tremors and seizures.   Hematological:  Negative for adenopathy. Does not bruise/bleed easily.   All other systems reviewed and are negative.    Objective:     Vital Signs (Most Recent):  Temp: 98.2 °F (36.8 °C) (12/04/23 0503)  Pulse: 63 (12/04/23 0503)  Resp: 17 (12/04/23 0640)  BP: (!) 156/81 (12/04/23 0503)  SpO2: 95 % (12/04/23 0503) Vital Signs (24h Range):  Temp:  [98.1 °F (36.7 °C)-98.2 °F (36.8 °C)] 98.2 °F (36.8 °C)  Pulse:  [63-77] 63  Resp:  [16-20] 17  SpO2:  [95 %-100 %] 95 %  BP: (130-159)/(68-94) 156/81     Weight: 121.6 kg (268 lb)  Body mass index is 36.35 kg/m².     Physical Exam  Vitals and nursing note reviewed.   Constitutional:       Appearance: Normal appearance. He is well-developed.   HENT:      Head: Normocephalic and atraumatic.      Nose: Nose normal. No septal deviation.   Eyes:      Conjunctiva/sclera: Conjunctivae normal.      Pupils: Pupils are equal, round, and reactive to light.   Neck:      Thyroid: No  thyroid mass.      Vascular: No JVD.      Trachea: No tracheal tenderness or tracheal deviation.   Cardiovascular:      Rate and Rhythm: Normal rate and regular rhythm.      Heart sounds: S1 normal and S2 normal. No murmur heard.     No friction rub. No gallop.   Pulmonary:      Effort: Pulmonary effort is normal.      Breath sounds: Normal breath sounds. No decreased breath sounds, wheezing, rhonchi or rales.   Abdominal:      General: Abdomen is protuberant. Bowel sounds are normal. There is no distension.      Palpations: Abdomen is soft. There is no hepatomegaly, splenomegaly or mass.      Tenderness: There is abdominal tenderness in the right upper quadrant and epigastric area.   Skin:     General: Skin is warm.      Findings: No rash.   Neurological:      General: No focal deficit present.      Mental Status: He is alert and oriented to person, place, and time.      Cranial Nerves: No cranial nerve deficit.      Sensory: No sensory deficit.   Psychiatric:         Mood and Affect: Mood normal.         Behavior: Behavior normal.              CRANIAL NERVES     CN III, IV, VI   Pupils are equal, round, and reactive to light.       Significant Labs: All pertinent labs within the past 24 hours have been reviewed.  CBC:   Recent Labs   Lab 12/04/23  0127   WBC 11.12   HGB 14.8   HCT 43.2        CMP:   Recent Labs   Lab 12/04/23  0127      K 3.6      CO2 26   *   BUN 12   CREATININE 1.0   CALCIUM 8.9   PROT 7.2   ALBUMIN 4.1   BILITOT 0.4   ALKPHOS 38*   AST 31   ALT 49*   ANIONGAP 12     Lipase:   Recent Labs   Lab 12/04/23  0127   LIPASE 19     Urine Studies:   Recent Labs   Lab 12/04/23  0124   COLORU Yellow   APPEARANCEUA Hazy*   PHUR 7.0   SPECGRAV 1.020   PROTEINUA Negative   GLUCUA Trace*   KETONESU Negative   BILIRUBINUA Negative   OCCULTUA Negative   NITRITE Negative   UROBILINOGEN Negative   LEUKOCYTESUR Negative       Significant Imaging: I have reviewed all pertinent imaging  results/findings within the past 24 hours.  US abdomen: Cholelithiasis without ultrasound evidence of acute cholecystitis. Note that negative sonographic  Larose's sign is unreliable as patient has received pain medication.    CT abdomen: IMPRESSION:  Cholelithiasis is present. If acute cholecystitis is suspected, ultrasound can provide better  characterization.    Assessment/Plan:     * Cholelithiasis  Admit to obs  CT shows cholelithiasis but no cholecystitis. US of abdomen also shows cholecystitis but no wall thickening. However patient is in a lot of pain and very tender to the RUQ and epigastric area, would like to discuss having his gall bladder removed while he is here.   IVF hydration  Pain meds PRN  Consult general surgery        VTE Risk Mitigation (From admission, onward)           Ordered     enoxaparin injection 40 mg  Daily         12/04/23 0637     IP VTE HIGH RISK PATIENT  Once         12/04/23 0637     Place sequential compression device  Until discontinued         12/04/23 0637                              VAL Dumont  Department of Hospital Medicine  [facility]

## 2023-12-04 NOTE — ASSESSMENT & PLAN NOTE
43-year-old with signs and symptoms consistent with acute cholecystitis.  Gallstones on imaging.  Plan for laparoscopic cholecystectomy today.  I spoke with the primary team.

## 2023-12-04 NOTE — OP NOTE
Laparoscopic cholecystectomy   Procedure Note    Date of procedure:   12/04/2023    Indications:  43-year-old male with acute cholecystitis here for laparoscopic cholecystectomy    Pre-operative Diagnosis: acute cholecystitis    Post-operative Diagnosis: Same    Surgeon: Alicia Rothman MD    Assistants: Tawny Granger, resident md    Anesthesia: General endotracheal anesthesia    ASA Class: 3    Procedure Details   The patient was seen in the Holding Room. The risks, benefits, complications, treatment options, and expected outcomes were discussed with the patient. The possibilities of reaction to medication, pulmonary aspiration, perforation of viscus, bleeding, recurrent infection, the need for additional procedures, failure to diagnose a condition, and creating a complication requiring transfusion or operation were discussed with the patient. The patient concurred with the proposed plan, giving informed consent. The site of surgery properly noted/marked. The patient was taken to Operating Room 2 identified as Max Flores and the procedure verified as laparoscopic cholecystectomy. A Time Out was held and the above information confirmed.    Full general anesthesia was induced with orotracheal intubation. The patient was prepped and draped in a supine position. Appropriate antibiotics were given intravenously. Arms were out.      Local anesthetic agent was injected into the skin near the right upper quadrant and an incision made. 5 mm optiview trocar was used to enter safely into the peritoneal cavity.  Pneumoperitoneum was then created with CO2 and tolerated well without any adverse changes in the patient's vital signs. Additional trocars were introduced under direct vision. All skin incisions were infiltrated with a local anesthetic agent before making the incision and placing the trocars.     Patient was placed in appropriate position and the gallbladder was easily identified.  This appeared distended and dark  in color.  It did not appear healthy or the appropriate color.  We are unable to easily grasp it and it was decompressed using a decompression needle.  When this was done the gallbladder was grasped and elevated above the liver at the level of the fundus.    The neck was grasped and the adhesions were bluntly taken down to expose the cystic duct and cystic artery.  These were circumferentially dissected bluntly.  Once the critical view was obtained and photographed the cystic duct was clipped and divided.  The cystic artery was then clipped and divided.  Gallbladder was then removed from gallbladder fossa using the cautery.  Gallbladder was then placed in Endo-Catch bag and removed via the umbilical port site.  The gallbladder fossa was reinspected for hemostasis which was achieved using the cautery.  No other abnormalities were seen.  The right upper quadrant was then irrigated and suctioned.  Once this was done, a zero vicryl on a Jose-Jesus was used to close the fascia of the umbilical port site.  The skin was then closed with Monocryl after the CO2 insufflation was stopped, patient was flattened, CO2 removed, and trocars removed.      Instrument, sponge, and needle counts were correct prior to wound closure and at the conclusion of the case.     Findings:  acute cholecystitis, cholelithiasis    Estimated Blood Loss: 20.0 cc    Drains: none    Total IV Fluids: see anesthesia    Specimens: gallbladder    Implants: none    Complications:  None; patient tolerated the procedure well.    Disposition: PACU - hemodynamically stable.    Condition: stable    Attending Attestation: I was present and scrubbed for the entire procedure.

## 2023-12-04 NOTE — ANESTHESIA POSTPROCEDURE EVALUATION
Anesthesia Post Evaluation    Patient: Max Flores    Procedure(s) Performed: Procedure(s) (LRB):  CHOLECYSTECTOMY, LAPAROSCOPIC (N/A)    Final Anesthesia Type: general      Patient location during evaluation: PACU  Patient participation: Yes- Able to Participate  Level of consciousness: awake and alert  Post-procedure vital signs: reviewed and stable  Pain management: adequate  Airway patency: patent    PONV status at discharge: No PONV  Anesthetic complications: no      Cardiovascular status: blood pressure returned to baseline  Respiratory status: unassisted  Hydration status: euvolemic  Follow-up not needed.              Vitals Value Taken Time   /74 12/04/23 1421   Temp 36.7 °C (98.1 °F) 12/04/23 1421   Pulse 85 12/04/23 1421   Resp 18 12/04/23 1421   SpO2 93 % 12/04/23 1421         Event Time   Out of Recovery 14:15:00         Pain/Ruby Score: Pain Rating Prior to Med Admin: 2 (12/4/2023  2:08 PM)  Pain Rating Post Med Admin: 0 (12/4/2023  2:15 PM)  Ruby Score: 9 (12/4/2023  2:15 PM)

## 2023-12-04 NOTE — ED PROVIDER NOTES
Encounter Date: 12/4/2023       History     Chief Complaint   Patient presents with    Abdominal Pain     To mid abdomen to back with vomiting     43-year-old male no pertinent past medical history presents today with abdominal pain.  Patient describes it as epigastric abdominal pain that radiates to his back.  Reports some nausea and vomiting.  Nonbloody nonbilious emesis.  Patient reports he took some Zofran Pepto and Gas-X without any relief.  He also reports some diarrhea.  Nonbloody diarrhea.  Denies any fevers chills chest pain or shortness of breath.  Denies any dysuria hematuria.  Denies any history of kidney stones.  Denies any alcohol use.  Or drug use.    The history is provided by the patient. No  was used.     Review of patient's allergies indicates:   Allergen Reactions    Morphine Hives and Rash     Past Medical History:   Diagnosis Date    Mixed hyperlipidemia      History reviewed. No pertinent surgical history.  History reviewed. No pertinent family history.  Social History     Tobacco Use    Smoking status: Never    Smokeless tobacco: Never   Substance Use Topics    Alcohol use: Not Currently     Review of Systems    Physical Exam     Initial Vitals [12/04/23 0101]   BP Pulse Resp Temp SpO2   (!) 130/94 66 18 98.1 °F (36.7 °C) 98 %      MAP       --         Physical Exam    Nursing note and vitals reviewed.  Constitutional: He appears well-developed. No distress.   HENT:   Head: Normocephalic and atraumatic.   Nose: Nose normal.   Eyes: EOM are normal. Pupils are equal, round, and reactive to light.   Neck: Neck supple. No tracheal deviation present. No JVD present.   Normal range of motion.  Cardiovascular:  Normal rate, regular rhythm, normal heart sounds and intact distal pulses.     Exam reveals no gallop and no friction rub.       No murmur heard.  Pulmonary/Chest: Breath sounds normal. No respiratory distress. He has no wheezes. He has no rhonchi. He has no rales.    Abdominal: Abdomen is soft. Bowel sounds are normal. He exhibits no distension. There is abdominal tenderness.   Positive Larose's sign There is no rebound (ruq ttp) and no guarding.   Musculoskeletal:         General: Normal range of motion.      Cervical back: Normal range of motion and neck supple.     Neurological: He is alert and oriented to person, place, and time. He has normal strength. No cranial nerve deficit or sensory deficit.   Skin: Skin is warm and dry. Capillary refill takes less than 2 seconds. No rash noted.   Psychiatric: He has a normal mood and affect.         ED Course   Procedures  Labs Reviewed   CBC W/ AUTO DIFFERENTIAL - Abnormal; Notable for the following components:       Result Value    MPV 8.9 (*)     Gran # (ANC) 8.0 (*)     Immature Grans (Abs) 0.06 (*)     All other components within normal limits   COMPREHENSIVE METABOLIC PANEL - Abnormal; Notable for the following components:    Glucose 153 (*)     Alkaline Phosphatase 38 (*)     ALT 49 (*)     All other components within normal limits   URINALYSIS, REFLEX TO URINE CULTURE - Abnormal; Notable for the following components:    Appearance, UA Hazy (*)     Glucose, UA Trace (*)     All other components within normal limits    Narrative:     Specimen Source->Urine   LIPASE          Imaging Results              US Abdomen Limited (In process)                      CT Abdomen Pelvis With IV Contrast NO Oral Contrast (In process)                      Medications   HYDROmorphone injection 0.5 mg (has no administration in time range)   sodium chloride 0.9% flush 3 mL (has no administration in time range)   enoxaparin injection 40 mg (has no administration in time range)   melatonin tablet 9 mg (has no administration in time range)   ondansetron injection 8 mg (has no administration in time range)   polyethylene glycol packet 17 g (has no administration in time range)   acetaminophen tablet 650 mg (has no administration in time range)    simethicone chewable tablet 80 mg (has no administration in time range)   aluminum-magnesium hydroxide-simethicone 200-200-20 mg/5 mL suspension 30 mL (has no administration in time range)   acetaminophen tablet 650 mg (has no administration in time range)   naloxone 0.4 mg/mL injection 0.02 mg (has no administration in time range)   potassium bicarbonate disintegrating tablet 50 mEq (has no administration in time range)   potassium bicarbonate disintegrating tablet 35 mEq (has no administration in time range)   potassium bicarbonate disintegrating tablet 60 mEq (has no administration in time range)   magnesium oxide tablet 800 mg (has no administration in time range)   magnesium oxide tablet 800 mg (has no administration in time range)   potassium, sodium phosphates 280-160-250 mg packet 2 packet (has no administration in time range)   potassium, sodium phosphates 280-160-250 mg packet 2 packet (has no administration in time range)   potassium, sodium phosphates 280-160-250 mg packet 2 packet (has no administration in time range)   glucose chewable tablet 16 g (has no administration in time range)   glucose chewable tablet 24 g (has no administration in time range)   glucagon (human recombinant) injection 1 mg (has no administration in time range)   lactated ringers infusion (has no administration in time range)   dextrose 10% bolus 125 mL 125 mL (has no administration in time range)   dextrose 10% bolus 250 mL 250 mL (has no administration in time range)   pantoprazole injection 40 mg (has no administration in time range)   ketorolac injection 15 mg (15 mg Intravenous Given 12/4/23 0145)   ondansetron injection 4 mg (4 mg Intravenous Given 12/4/23 0145)   HYDROmorphone injection 1 mg (1 mg Intravenous Given 12/4/23 0409)   iohexoL (OMNIPAQUE 350) 350 mg iodine/mL injection (100 mLs  Given 12/4/23 0218)     Medical Decision Making  43-year-old male no pertinent past medical history in his today with right upper  quadrant and epigastric abdominal pain that began yesterday.  Associated with nausea and vomiting.  History and exam not consistent with AAA, pancreatitis, SBO, appendicitis, mesenteric ischemia, nephrolithiasis, pyelonephritis, or diverticulitis.    ED Interventions: analgesia PRN  ED Workup: CBC, CMP, Lipase, CT, Abdominal US      CT shows cholelithiasis and given positive Larose sign and persistent pain ultrasound ordered.  Ultrasound shows cholelithiasis without evidence of cholecystitis.  No perforation.  However given patient's persistent pain despite pain medications and positive Larose sign on initial exam, I discussed case with on-call surgeon, Dr. Villalobos, who agrees with plan for admission for pain control and surgical evaluation.  Patient accepted by Hospital Medicine for further management.      Amount and/or Complexity of Data Reviewed  Labs: ordered.  Radiology: ordered. Decision-making details documented in ED Course.    Risk  Prescription drug management.               ED Course as of 12/04/23 0639   Mon Dec 04, 2023   0402 CT Abdomen Pelvis With IV Contrast NO Oral Contrast  IMPRESSION:  Cholelithiasis is present. If acute cholecystitis is suspected, ultrasound can provide better  characterization.  Dictated and Authenticated by: Otto Whitfield MD  12/04/2023 2:54 AM Central Time ([place] & [place]) [BD]   0542 US Abdomen Limited  IMPRESSION:  Cholelithiasis without ultrasound evidence of acute cholecystitis. Note that negative sonographic  Larose's sign is unreliable as patient has received pain medication.  Dictated and Authenticated by: Otto Whitfield MD  12/04/2023 5:31 AM Central Time ([place] & [place]) [BD]      ED Course User Index  [BD] Jason Mars MD                           Clinical Impression:  Final diagnoses:  [R10.13] Epigastric pain (Primary)  [R10.11] Right upper quadrant abdominal pain  [K80.20] Cholelithiasis          ED Disposition Condition    Observation Stable                 Jason Mars MD  12/04/23 0622

## 2023-12-04 NOTE — TRANSFER OF CARE
Anesthesia Transfer of Care Note    Patient: Max Flores    Procedure(s) Performed: Procedure(s) (LRB):  CHOLECYSTECTOMY, LAPAROSCOPIC (N/A)    Patient location: PACU    Anesthesia Type: general    Transport from OR: Transported from OR on 2-3 L/min O2 by NC with adequate spontaneous ventilation    Post pain: adequate analgesia    Post assessment: no apparent anesthetic complications and tolerated procedure well    Post vital signs: stable    Level of consciousness: responds to stimulation and sedated    Nausea/Vomiting: no nausea/vomiting    Complications: none    Transfer of care protocol was followed    Last vitals: Visit Vitals  BP (!) 129/59 (BP Location: Left arm, Patient Position: Lying)   Pulse 97   Temp 36.7 °C (98.1 °F)   Resp 16   Ht 6' (1.829 m)   Wt 122.5 kg (270 lb)   SpO2 95%   BMI 36.62 kg/m²

## 2023-12-04 NOTE — HOSPITAL COURSE
Patient was admitted to the hospital with abdominal pain.  The patient was monitored closely throughout his hospital stay.  Abdominal ultrasound and CT abdomen were obtained was significant for cholelithiasis.  General surgery was consulted on admission.  Patient underwent laparoscopic cholecystectomy with Dr. Rothman on 12/4.  Patient tolerated procedure well.  Diet was advanced and tolerated.  Patient was seen on day of discharge.  Patient was cleared for discharge by surgery.  The patient to follow up with PCP and General surgery.  Patient was prescribed pain medications on discharge.  Return precautions discussed at length and patient voiced understanding.  All questions answered.

## 2023-12-04 NOTE — ED TRIAGE NOTES
Max Flores is here with abdominal pain to mid abdomen to back with vomiting, started vomiting at 2230 and has gotten worse, also can't get comfortable.

## 2023-12-04 NOTE — TELEPHONE ENCOUNTER
----- Message from Benjamín Mcpherson sent at 12/4/2023  7:54 AM CST -----  Type: Needs Medical Advice    Who Called: [facility]     Best Call Back Number: [phone]    Additional Information: [facility] is calling to schedule a consult with general surgery. Pt is inpatient at the moment. Please call back to advise, Thanks!

## 2023-12-04 NOTE — ANESTHESIA PROCEDURE NOTES
Intubation    Date/Time: 12/4/2023 12:41 PM    Performed by: Desmond Alexis CRNA  Authorized by: Max De La Torre MD    Intubation:     Induction:  Intravenous    Intubated:  Postinduction    Mask Ventilation:  Easy with oral airway    Attempts:  1    Attempted By:  CRNA    Method of Intubation:  Video laryngoscopy    Laryngeal View Grade: Grade I - full view of cords      Difficult Airway Encountered?: No      Complications:  None    Airway Device:  Oral endotracheal tube    Airway Device Size:  8.0    Style/Cuff Inflation:  Cuffed (inflated to minimal occlusive pressure)    Inflation Amount (mL):  5    Tube secured:  23    Secured at:  The lips    Placement Verified By:  Capnometry    Complicating Factors:  None    Findings Post-Intubation:  BS equal bilateral

## 2023-12-04 NOTE — HPI
43-year-old male with epigastric right upper quadrant pain for about 24 hours post prandial.  Associated nausea vomiting, no prior episodes, pain radiates to back.

## 2023-12-04 NOTE — SUBJECTIVE & OBJECTIVE
Past Medical History:   Diagnosis Date    Mixed hyperlipidemia        History reviewed. No pertinent surgical history.    Review of patient's allergies indicates:   Allergen Reactions    Morphine Hives and Rash       No current facility-administered medications on file prior to encounter.     Current Outpatient Medications on File Prior to Encounter   Medication Sig    [DISCONTINUED] diclofenac sodium (VOLTAREN) 1 % Gel APPLY 2 GRAMS TOPICALLY FOUR TIMES A DAY AS NEEDED FOR PAIN     Family History    None       Tobacco Use    Smoking status: Never    Smokeless tobacco: Never   Substance and Sexual Activity    Alcohol use: Not Currently    Drug use: Not on file    Sexual activity: Not on file     Review of Systems   Constitutional:  Negative for chills and fever.   HENT:  Negative for congestion and sore throat.    Eyes:  Negative for visual disturbance.   Respiratory:  Negative for cough and shortness of breath.    Cardiovascular:  Negative for chest pain and palpitations.   Gastrointestinal:  Positive for abdominal pain, nausea and vomiting. Negative for constipation and diarrhea.   Endocrine: Negative for cold intolerance and heat intolerance.   Genitourinary:  Negative for dysuria and hematuria.   Musculoskeletal:  Negative for arthralgias and myalgias.   Skin:  Negative for rash.   Neurological:  Negative for tremors and seizures.   Hematological:  Negative for adenopathy. Does not bruise/bleed easily.   All other systems reviewed and are negative.    Objective:     Vital Signs (Most Recent):  Temp: 98.2 °F (36.8 °C) (12/04/23 0503)  Pulse: 63 (12/04/23 0503)  Resp: 17 (12/04/23 0640)  BP: (!) 156/81 (12/04/23 0503)  SpO2: 95 % (12/04/23 0503) Vital Signs (24h Range):  Temp:  [98.1 °F (36.7 °C)-98.2 °F (36.8 °C)] 98.2 °F (36.8 °C)  Pulse:  [63-77] 63  Resp:  [16-20] 17  SpO2:  [95 %-100 %] 95 %  BP: (130-159)/(68-94) 156/81     Weight: 121.6 kg (268 lb)  Body mass index is 36.35 kg/m².     Physical Exam  Vitals  and nursing note reviewed.   Constitutional:       Appearance: Normal appearance. He is well-developed.   HENT:      Head: Normocephalic and atraumatic.      Nose: Nose normal. No septal deviation.   Eyes:      Conjunctiva/sclera: Conjunctivae normal.      Pupils: Pupils are equal, round, and reactive to light.   Neck:      Thyroid: No thyroid mass.      Vascular: No JVD.      Trachea: No tracheal tenderness or tracheal deviation.   Cardiovascular:      Rate and Rhythm: Normal rate and regular rhythm.      Heart sounds: S1 normal and S2 normal. No murmur heard.     No friction rub. No gallop.   Pulmonary:      Effort: Pulmonary effort is normal.      Breath sounds: Normal breath sounds. No decreased breath sounds, wheezing, rhonchi or rales.   Abdominal:      General: Abdomen is protuberant. Bowel sounds are normal. There is no distension.      Palpations: Abdomen is soft. There is no hepatomegaly, splenomegaly or mass.      Tenderness: There is abdominal tenderness in the right upper quadrant and epigastric area.   Skin:     General: Skin is warm.      Findings: No rash.   Neurological:      General: No focal deficit present.      Mental Status: He is alert and oriented to person, place, and time.      Cranial Nerves: No cranial nerve deficit.      Sensory: No sensory deficit.   Psychiatric:         Mood and Affect: Mood normal.         Behavior: Behavior normal.              CRANIAL NERVES     CN III, IV, VI   Pupils are equal, round, and reactive to light.       Significant Labs: All pertinent labs within the past 24 hours have been reviewed.  CBC:   Recent Labs   Lab 12/04/23 0127   WBC 11.12   HGB 14.8   HCT 43.2        CMP:   Recent Labs   Lab 12/04/23 0127      K 3.6      CO2 26   *   BUN 12   CREATININE 1.0   CALCIUM 8.9   PROT 7.2   ALBUMIN 4.1   BILITOT 0.4   ALKPHOS 38*   AST 31   ALT 49*   ANIONGAP 12     Lipase:   Recent Labs   Lab 12/04/23  0127   LIPASE 19     Urine  Studies:   Recent Labs   Lab 12/04/23  0124   COLORU Yellow   APPEARANCEUA Hazy*   PHUR 7.0   SPECGRAV 1.020   PROTEINUA Negative   GLUCUA Trace*   KETONESU Negative   BILIRUBINUA Negative   OCCULTUA Negative   NITRITE Negative   UROBILINOGEN Negative   LEUKOCYTESUR Negative       Significant Imaging: I have reviewed all pertinent imaging results/findings within the past 24 hours.  US abdomen: Cholelithiasis without ultrasound evidence of acute cholecystitis. Note that negative sonographic  Larose's sign is unreliable as patient has received pain medication.    CT abdomen: IMPRESSION:  Cholelithiasis is present. If acute cholecystitis is suspected, ultrasound can provide better  characterization.

## 2023-12-05 VITALS
DIASTOLIC BLOOD PRESSURE: 58 MMHG | OXYGEN SATURATION: 96 % | WEIGHT: 270 LBS | HEIGHT: 72 IN | RESPIRATION RATE: 16 BRPM | SYSTOLIC BLOOD PRESSURE: 125 MMHG | TEMPERATURE: 99 F | BODY MASS INDEX: 36.57 KG/M2 | HEART RATE: 87 BPM

## 2023-12-05 LAB
ALBUMIN SERPL BCP-MCNC: 3.5 G/DL (ref 3.5–5.2)
ALP SERPL-CCNC: 35 U/L (ref 55–135)
ALT SERPL W/O P-5'-P-CCNC: 56 U/L (ref 10–44)
ANION GAP SERPL CALC-SCNC: 11 MMOL/L (ref 8–16)
AST SERPL-CCNC: 29 U/L (ref 10–40)
BASOPHILS # BLD AUTO: 0.02 K/UL (ref 0–0.2)
BASOPHILS NFR BLD: 0.2 % (ref 0–1.9)
BILIRUB SERPL-MCNC: 0.5 MG/DL (ref 0.1–1)
BUN SERPL-MCNC: 13 MG/DL (ref 6–20)
CALCIUM SERPL-MCNC: 8.7 MG/DL (ref 8.7–10.5)
CHLORIDE SERPL-SCNC: 105 MMOL/L (ref 95–110)
CO2 SERPL-SCNC: 24 MMOL/L (ref 23–29)
CREAT SERPL-MCNC: 1 MG/DL (ref 0.5–1.4)
DIFFERENTIAL METHOD: ABNORMAL
EOSINOPHIL # BLD AUTO: 0 K/UL (ref 0–0.5)
EOSINOPHIL NFR BLD: 0 % (ref 0–8)
ERYTHROCYTE [DISTWIDTH] IN BLOOD BY AUTOMATED COUNT: 13.2 % (ref 11.5–14.5)
EST. GFR  (NO RACE VARIABLE): >60 ML/MIN/1.73 M^2
GLUCOSE SERPL-MCNC: 141 MG/DL (ref 70–110)
HCT VFR BLD AUTO: 41.4 % (ref 40–54)
HGB BLD-MCNC: 14.1 G/DL (ref 14–18)
IMM GRANULOCYTES # BLD AUTO: 0.08 K/UL (ref 0–0.04)
IMM GRANULOCYTES NFR BLD AUTO: 0.7 % (ref 0–0.5)
LYMPHOCYTES # BLD AUTO: 1.3 K/UL (ref 1–4.8)
LYMPHOCYTES NFR BLD: 12.2 % (ref 18–48)
MAGNESIUM SERPL-MCNC: 2.1 MG/DL (ref 1.6–2.6)
MCH RBC QN AUTO: 30.5 PG (ref 27–31)
MCHC RBC AUTO-ENTMCNC: 34.1 G/DL (ref 32–36)
MCV RBC AUTO: 90 FL (ref 82–98)
MONOCYTES # BLD AUTO: 0.9 K/UL (ref 0.3–1)
MONOCYTES NFR BLD: 8.4 % (ref 4–15)
NEUTROPHILS # BLD AUTO: 8.5 K/UL (ref 1.8–7.7)
NEUTROPHILS NFR BLD: 78.5 % (ref 38–73)
NRBC BLD-RTO: 0 /100 WBC
PHOSPHATE SERPL-MCNC: 3.3 MG/DL (ref 2.7–4.5)
PLATELET # BLD AUTO: 241 K/UL (ref 150–450)
PMV BLD AUTO: 9.3 FL (ref 9.2–12.9)
POTASSIUM SERPL-SCNC: 4 MMOL/L (ref 3.5–5.1)
PROT SERPL-MCNC: 6.7 G/DL (ref 6–8.4)
RBC # BLD AUTO: 4.62 M/UL (ref 4.6–6.2)
SODIUM SERPL-SCNC: 140 MMOL/L (ref 136–145)
WBC # BLD AUTO: 10.77 K/UL (ref 3.9–12.7)

## 2023-12-05 PROCEDURE — 83735 ASSAY OF MAGNESIUM: CPT | Performed by: SURGERY

## 2023-12-05 PROCEDURE — 96366 THER/PROPH/DIAG IV INF ADDON: CPT

## 2023-12-05 PROCEDURE — 80053 COMPREHEN METABOLIC PANEL: CPT | Performed by: SURGERY

## 2023-12-05 PROCEDURE — 63600175 PHARM REV CODE 636 W HCPCS: Performed by: SURGERY

## 2023-12-05 PROCEDURE — 94799 UNLISTED PULMONARY SVC/PX: CPT | Mod: XB

## 2023-12-05 PROCEDURE — 36415 COLL VENOUS BLD VENIPUNCTURE: CPT | Performed by: SURGERY

## 2023-12-05 PROCEDURE — 84100 ASSAY OF PHOSPHORUS: CPT | Performed by: SURGERY

## 2023-12-05 PROCEDURE — G0378 HOSPITAL OBSERVATION PER HR: HCPCS

## 2023-12-05 PROCEDURE — C9113 INJ PANTOPRAZOLE SODIUM, VIA: HCPCS | Performed by: SURGERY

## 2023-12-05 PROCEDURE — 25000003 PHARM REV CODE 250: Performed by: SURGERY

## 2023-12-05 PROCEDURE — 96376 TX/PRO/DX INJ SAME DRUG ADON: CPT

## 2023-12-05 PROCEDURE — 94761 N-INVAS EAR/PLS OXIMETRY MLT: CPT

## 2023-12-05 PROCEDURE — 85025 COMPLETE CBC W/AUTO DIFF WBC: CPT | Performed by: SURGERY

## 2023-12-05 RX ORDER — HYDROCODONE BITARTRATE AND ACETAMINOPHEN 5; 325 MG/1; MG/1
1 TABLET ORAL EVERY 6 HOURS PRN
Qty: 12 TABLET | Refills: 0 | Status: SHIPPED | OUTPATIENT
Start: 2023-12-05 | End: 2023-12-08

## 2023-12-05 RX ADMIN — PIPERACILLIN AND TAZOBACTAM 3.38 G: 3; .375 INJECTION, POWDER, LYOPHILIZED, FOR SOLUTION INTRAVENOUS; PARENTERAL at 08:12

## 2023-12-05 RX ADMIN — HYDROCODONE BITARTRATE AND ACETAMINOPHEN 2 TABLET: 5; 325 TABLET ORAL at 08:12

## 2023-12-05 RX ADMIN — PIPERACILLIN AND TAZOBACTAM 3.38 G: 3; .375 INJECTION, POWDER, LYOPHILIZED, FOR SOLUTION INTRAVENOUS; PARENTERAL at 01:12

## 2023-12-05 RX ADMIN — POLYETHYLENE GLYCOL (3350) 17 G: 17 POWDER, FOR SOLUTION ORAL at 08:12

## 2023-12-05 RX ADMIN — PANTOPRAZOLE SODIUM 40 MG: 40 INJECTION, POWDER, LYOPHILIZED, FOR SOLUTION INTRAVENOUS at 08:12

## 2023-12-05 NOTE — DISCHARGE SUMMARY
[facility] Medicine  Discharge Summary      Patient Name: Max Flores  MRN: [id number]  JAMES: [id number]  Patient Class: OP- Observation  Admission Date: 12/4/2023  Hospital Length of Stay: 0 days  Discharge Date and Time:  12/05/2023 1:28 PM  Attending Physician: Leta att. providers found   Discharging Provider: Maria Eugenia Sharpe PA-C  Primary Care Provider: [facility]    Primary Care Team: Networked reference to record PCT     HPI:   Max Flores is a 43 year old male with a past medical history of hyperlipidemia, not on medication, who presented to the ED with a complaint of abdominal pain. He reports sudden onset of RUQ and epigastric pain, with radiation to the back tonight, with associated nausea and vomiting. He initially thought he had a stomach bug but the pain persisted and he was unable to tolerate it. He denies fever, chills, urinary complaint or other complaint. ED work up included a  CBC, which was unremarkable, and CMP which shows an elevated blood sugar. Lipase is negative at 19. Urinalysis negative for evidence of infection. CT of the abdomen performed which showed cholelithiasis without evidence of cholecystitis. US of the abdomen performed which also showed gall stones and no wall thickening. Patient continued to have significant pain to the area. General Surgery consulted, Dr. Villalobos, who recommended admission for observation, and to consult the day provider to discuss possible surgery. Hospital Medicine consulted for admission and further management.     Procedure(s) (LRB):  CHOLECYSTECTOMY, LAPAROSCOPIC (N/A)      Hospital Course:   Patient was admitted to the hospital with abdominal pain.  The patient was monitored closely throughout his hospital stay.  Abdominal ultrasound and CT abdomen were obtained was significant for cholelithiasis.  General surgery was consulted on admission.  Patient underwent laparoscopic cholecystectomy with   Lorna on 12/4.  Patient tolerated procedure well.  Diet was advanced and tolerated.  Patient was seen on day of discharge.  Patient was cleared for discharge by surgery.  The patient to follow up with PCP and General surgery.  Patient was prescribed pain medications on discharge.  Return precautions discussed at length and patient voiced understanding.  All questions answered.     Goals of Care Treatment Preferences:  Code Status: Full Code      Consults:   Consults (From admission, onward)          Status Ordering Provider     Inpatient consult to General Surgery  Once        Provider:  Alicia Rothman MD    Completed MICHEAL SINGH            No new Assessment & Plan notes have been filed under this hospital service since the last note was generated.  Service: Hospital Medicine    Final Active Diagnoses:    Diagnosis Date Noted POA    PRINCIPAL PROBLEM:  Cholelithiasis [K80.20] 12/04/2023 Yes    Calculus of gallbladder with acute cholecystitis without obstruction [K80.00] 12/04/2023 Yes      Problems Resolved During this Admission:       Discharged Condition: good    Disposition: Home or Self Care    Follow Up:    Patient Instructions:      Notify your health care provider if you experience any of the following:  temperature >100.4     Notify your health care provider if you experience any of the following:  persistent nausea and vomiting or diarrhea     Notify your health care provider if you experience any of the following:  severe uncontrolled pain     Notify your health care provider if you experience any of the following:  redness, tenderness, or signs of infection (pain, swelling, redness, odor or green/yellow discharge around incision site)     Notify your health care provider if you experience any of the following:  difficulty breathing or increased cough     Notify your health care provider if you experience any of the following:  increased confusion or weakness     Activity as tolerated        Significant Diagnostic Studies: Labs: CMP   Recent Labs   Lab 12/04/23  0127 12/05/23  0440    140   K 3.6 4.0    105   CO2 26 24   * 141*   BUN 12 13   CREATININE 1.0 1.0   CALCIUM 8.9 8.7   PROT 7.2 6.7   ALBUMIN 4.1 3.5   BILITOT 0.4 0.5   ALKPHOS 38* 35*   AST 31 29   ALT 49* 56*   ANIONGAP 12 11    and CBC   Recent Labs   Lab 12/04/23  0127 12/05/23  0440   WBC 11.12 10.77   HGB 14.8 14.1   HCT 43.2 41.4    241       Pending Diagnostic Studies:       None           Medications:  Reconciled Home Medications:      Medication List        START taking these medications      HYDROcodone-acetaminophen 5-325 mg per tablet  Commonly known as: NORCO  Take 1 tablet by mouth every 6 (six) hours as needed for Pain.              Indwelling Lines/Drains at time of discharge:   Lines/Drains/Airways       None                   Time spent on the discharge of patient: 35 minutes         Maria Eugenia Sharpe PA-C  Department of Hospital Medicine  [facility]/Surg

## 2023-12-05 NOTE — PLAN OF CARE
POC discussed with patient, verbalized understanding. Patient with uneventful night, slept well between care. VS stable. ABX received. Abdominal puncture sites CDI with Derma knowles. Tolerating diet. Ambulated in barrera, tolerated well. Refused VS. Pain managed with oral pain medication.

## 2023-12-05 NOTE — NURSING
Pt cleared for Dc per Cm. Pt seen by Dr. Rothman and ok to dc. IV in right fa removed with cath intact. Gauze applied and secured with coban. No bleeding from site. Dc instructions given and reviewed with pt. Verbalized understanding. Pts wife at side. Pt refused wheelchair and states I will walk out. Pt dcd to wifes car and going home.

## 2023-12-05 NOTE — PLAN OF CARE
Pt cleared for discharge from case management  [facility], directed to fax clinicals and nurse will call pt to schedule PCP appt.  Faxed clinicals to [phone], attn:  Torrie     12/05/23 1053   Final Note   Assessment Type Final Discharge Note   Anticipated Discharge Disposition Home   What phone number can be called within the next 1-3 days to see how you are doing after discharge?   ([phone])   Hospital Resources/Appts/Education Provided Appointments scheduled and added to AVS

## 2023-12-05 NOTE — PLAN OF CARE
POC reviewed VSS afebrile pain managed with PRN medications Lap sites to Abdomen remain CDI no redness swelling or drainage noted tolerating regular diet without complications ambulated 250 feet in hallway no acute distress noted at this time.

## 2023-12-19 ENCOUNTER — OFFICE VISIT (OUTPATIENT)
Dept: SURGERY | Facility: CLINIC | Age: 43
End: 2023-12-19
Payer: MEDICAID

## 2023-12-19 VITALS
HEART RATE: 81 BPM | BODY MASS INDEX: 38.67 KG/M2 | SYSTOLIC BLOOD PRESSURE: 141 MMHG | RESPIRATION RATE: 16 BRPM | TEMPERATURE: 98 F | DIASTOLIC BLOOD PRESSURE: 87 MMHG | WEIGHT: 276.25 LBS | HEIGHT: 71 IN

## 2023-12-19 DIAGNOSIS — Z90.49 S/P CHOLECYSTECTOMY: Primary | ICD-10-CM

## 2023-12-19 PROCEDURE — 99999 PR PBB SHADOW E&M-EST. PATIENT-LVL III: CPT | Mod: PBBFAC,,,

## 2023-12-19 PROCEDURE — 99024 PR POST-OP FOLLOW-UP VISIT: ICD-10-PCS | Mod: ,,, | Performed by: SURGERY

## 2023-12-19 PROCEDURE — 1160F RVW MEDS BY RX/DR IN RCRD: CPT | Mod: CPTII,,, | Performed by: SURGERY

## 2023-12-19 PROCEDURE — 1159F MED LIST DOCD IN RCRD: CPT | Mod: CPTII,,, | Performed by: SURGERY

## 2023-12-19 PROCEDURE — 99999 PR PBB SHADOW E&M-EST. PATIENT-LVL III: ICD-10-PCS | Mod: PBBFAC,,,

## 2023-12-19 PROCEDURE — 3077F SYST BP >= 140 MM HG: CPT | Mod: CPTII,,, | Performed by: SURGERY

## 2023-12-19 PROCEDURE — 99213 OFFICE O/P EST LOW 20 MIN: CPT | Mod: PBBFAC,PN

## 2023-12-19 PROCEDURE — 1159F PR MEDICATION LIST DOCUMENTED IN MEDICAL RECORD: ICD-10-PCS | Mod: CPTII,,, | Performed by: SURGERY

## 2023-12-19 PROCEDURE — 3079F DIAST BP 80-89 MM HG: CPT | Mod: CPTII,,, | Performed by: SURGERY

## 2023-12-19 PROCEDURE — 1160F PR REVIEW ALL MEDS BY PRESCRIBER/CLIN PHARMACIST DOCUMENTED: ICD-10-PCS | Mod: CPTII,,, | Performed by: SURGERY

## 2023-12-19 PROCEDURE — 3077F PR MOST RECENT SYSTOLIC BLOOD PRESSURE >= 140 MM HG: ICD-10-PCS | Mod: CPTII,,, | Performed by: SURGERY

## 2023-12-19 PROCEDURE — 99024 POSTOP FOLLOW-UP VISIT: CPT | Mod: ,,, | Performed by: SURGERY

## 2023-12-19 PROCEDURE — 3079F PR MOST RECENT DIASTOLIC BLOOD PRESSURE 80-89 MM HG: ICD-10-PCS | Mod: CPTII,,, | Performed by: SURGERY

## 2023-12-19 NOTE — LETTER
December 19, 2023      [facility]  [address]    [place] [address]  Phone: [phone]       Patient: Max Flores   YOB: 1980  Date of Visit: 12/19/2023    To Whom It May Concern:    Ahmet Flores  was at [facility] on 12/19/2023. The patient may return to work/school/PT on 12/19/23 with no restrictions. If you have any questions or concerns, or if I can be of further assistance, please do not hesitate to contact me.    Sincerely,    Alessandra Chiang NP

## 2023-12-21 NOTE — PROGRESS NOTES
" Clinic Follow-up  General Surgery    Date: 12/21/2023  Interval: Max Flores is a 43 y.o. male seen today in follow up s/p lap cholecystectomy on 12/4/23 with Dr Rothman        SUBJECTIVE:     Patient states pain is 0/10.    OBJECTIVE:     Vital Signs (Most Recent)  Vitals:    12/19/23 1133   BP: (!) 141/87   BP Location: Left forearm   Patient Position: Sitting   BP Method: Large (Automatic)   Pulse: 81   Resp: 16   Temp: 98.4 °F (36.9 °C)   TempSrc: Oral   Weight: 125.3 kg (276 lb 3.8 oz)   Height: 5' 11" (1.803 m)       Review of Systems - History obtained from chart review and the patient  General ROS: negative for - chills, fatigue, fever, or weight loss  Gastrointestinal ROS: no abdominal pain, change in bowel habits, or black or bloody stools    Physical Exam:  General: well developed, well nourished, appears stated age, no distress  Lungs:  normal respiratory effort  Heart: normal apical impulse  Abdomen: soft, non-tender non-distented; bowel sounds normal; no masses,  no organomegaly    Wound/Incision:  clean, dry, intact, no drainage, healed        Laboratory:  Labs within the past 24 hours have been reviewed.    Pathology: YES  Negative    Reviewed imaging from surgery    ASSESSMENT/PLAN:     Assessment: Doing well, uncomplicated post-operative course.    Plan: continue current treatment.    Alessandra Chiang NP     "
How Severe Is Your Skin Lesion?: moderate
Have Your Skin Lesions Been Treated?: not been treated
Is This A New Presentation, Or A Follow-Up?: Skin Lesion

## 2023-12-31 ENCOUNTER — OFFICE VISIT (OUTPATIENT)
Dept: URGENT CARE | Facility: CLINIC | Age: 43
End: 2023-12-31
Payer: MEDICAID

## 2023-12-31 VITALS
SYSTOLIC BLOOD PRESSURE: 120 MMHG | BODY MASS INDEX: 37.66 KG/M2 | HEIGHT: 71 IN | TEMPERATURE: 98 F | DIASTOLIC BLOOD PRESSURE: 71 MMHG | WEIGHT: 269 LBS | OXYGEN SATURATION: 98 % | RESPIRATION RATE: 18 BRPM

## 2023-12-31 DIAGNOSIS — R89.4 INFLUENZA A VIRUS NOT DETECTED: ICD-10-CM

## 2023-12-31 DIAGNOSIS — R05.9 COUGH, UNSPECIFIED TYPE: ICD-10-CM

## 2023-12-31 DIAGNOSIS — J06.9 VIRAL URI: Primary | ICD-10-CM

## 2023-12-31 LAB
CTP QC/QA: YES
CTP QC/QA: YES
FLUAV AG NPH QL: NEGATIVE
FLUBV AG NPH QL: NEGATIVE
S PYO RRNA THROAT QL PROBE: NEGATIVE

## 2023-12-31 PROCEDURE — 87804 POCT INFLUENZA A/B: ICD-10-PCS | Mod: 59,QW,, | Performed by: NURSE PRACTITIONER

## 2023-12-31 PROCEDURE — 87804 INFLUENZA ASSAY W/OPTIC: CPT | Mod: 59,QW,, | Performed by: NURSE PRACTITIONER

## 2023-12-31 PROCEDURE — 87880 STREP A ASSAY W/OPTIC: CPT | Mod: QW,,, | Performed by: NURSE PRACTITIONER

## 2023-12-31 PROCEDURE — 99214 PR OFFICE/OUTPT VISIT, EST, LEVL IV, 30-39 MIN: ICD-10-PCS | Mod: S$GLB,,, | Performed by: NURSE PRACTITIONER

## 2023-12-31 PROCEDURE — 87880 POCT RAPID STREP A: ICD-10-PCS | Mod: QW,,, | Performed by: NURSE PRACTITIONER

## 2023-12-31 PROCEDURE — 99214 OFFICE O/P EST MOD 30 MIN: CPT | Mod: S$GLB,,, | Performed by: NURSE PRACTITIONER

## 2023-12-31 RX ORDER — BENZONATATE 100 MG/1
100 CAPSULE ORAL 3 TIMES DAILY PRN
Qty: 30 CAPSULE | Refills: 0 | Status: SHIPPED | OUTPATIENT
Start: 2023-12-31

## 2023-12-31 RX ORDER — FLUTICASONE PROPIONATE 50 MCG
1 SPRAY, SUSPENSION (ML) NASAL DAILY
Qty: 15.8 ML | Refills: 0 | Status: SHIPPED | OUTPATIENT
Start: 2023-12-31

## 2023-12-31 RX ORDER — PROMETHAZINE HYDROCHLORIDE AND DEXTROMETHORPHAN HYDROBROMIDE 6.25; 15 MG/5ML; MG/5ML
5 SYRUP ORAL NIGHTLY PRN
Qty: 120 ML | Refills: 0 | Status: SHIPPED | OUTPATIENT
Start: 2023-12-31

## 2023-12-31 NOTE — PROGRESS NOTES
"Subjective:      Patient ID: Max Flores is a 43 y.o. male.    Vitals:  height is 5' 11" (1.803 m) and weight is 122 kg (269 lb). His temperature is 98.3 °F (36.8 °C). His blood pressure is 120/71 and his pulse is 78 (pended). His respiration is 18 and oxygen saturation is 98%.     Chief Complaint: Cough    Cough  The current episode started in the past 7 days. Associated symptoms include ear pain, a fever, myalgias, nasal congestion and a sore throat. He has tried OTC cough suppressant for the symptoms.       Constitution: Positive for fever.   HENT:  Positive for ear pain and sore throat.    Respiratory:  Positive for cough.    Musculoskeletal:  Positive for muscle ache.      Objective:     Physical Exam   Constitutional: normal  HENT:   Head: Normocephalic and atraumatic.   Ears:   Right Ear: Tympanic membrane, external ear and ear canal normal.   Left Ear: Tympanic membrane, external ear and ear canal normal.   Nose: Nose normal.   Mouth/Throat: Mucous membranes are moist. Oropharynx is clear.   Eyes: Extraocular movement intact   Pulmonary/Chest: Effort normal and breath sounds normal.   Abdominal: Normal appearance. He exhibits no distension.   Musculoskeletal: Normal range of motion.         General: Normal range of motion.   Neurological: He is alert.   Nursing note and vitals reviewed.        Results for orders placed or performed in visit on 12/31/23   POCT Influenza A/B Rapid Antigen   Result Value Ref Range    Rapid Influenza A Ag Negative Negative    Rapid Influenza B Ag Negative Negative     Acceptable Yes    POCT rapid strep A   Result Value Ref Range    Rapid Strep A Screen Negative Negative, Positive Slide, Positive Tube     Acceptable Yes       Assessment:     1. Cough, unspecified type        Plan:   Flu A & B negative  Strep A negative     Cough, unspecified type  -     POCT Influenza A/B Rapid Antigen  -     POCT rapid strep A                Patient Instructions " "                                                           URI   If your condition worsens or fails to improve we recommend that you receive another evaluation at the ER immediately or contact your PCP to discuss your concerns or return here. You must understand that you've received an urgent care treatment only and that you may be released before all your medical problems are known or treated. You the patient will arrange for follouwp care as instructed.   If we discussed that I think your illness is viral, it will not respond to antibiotics and will last 5-7 days. If we discussed "wait and see" antibiotics and if over the next few days the symptoms worsen start the antibiotics I have given you.   -  If you are female and on BCP and do take the antibiotics, use additional methods to prevent pregnancy while on the antibiotics and for one cycle after.   -  Flonase (fluticasone) is a nasal spray which is available over the counter and may help with your symptoms.   -  Zyrtec D, Claritin D or Allegra D can also help with symptoms of congestion and drainage.   -  If you have hypertension avoid using the "D" which is the decongestant.  Instead you can use Coricidin HBP for cold and cough symptoms.    -  If you just have drainage you can take plain Zyrtec, Claritin or Allegra   -  If you just have a congested feeling you can take pseudoephedrine (unless you have high blood pressure) which you have to sign for behind the counter. Do not buy the phenylephrine which is on the shelf as it is not effective   -  Rest and fluids are also important.   -  Tylenol or ibuprofen can also be used as directed for pain unless you have an allergy to them or medical condition such as stomach ulcers, kidney or liver disease or blood thinners etc for which you should not be taking these type of medications.   -  If you are flying in the next few days Afrin nose drops for the airplane flight upon take off and landing may help. Other than at " those times refrain from using afrin.   - If you were prescribed a narcotic do not drive or operate heavy machinery while taking these medications.

## 2024-01-16 ENCOUNTER — OCCUPATIONAL HEALTH (OUTPATIENT)
Dept: URGENT CARE | Facility: CLINIC | Age: 44
End: 2024-01-16

## 2024-01-16 DIAGNOSIS — Z13.9 ENCOUNTER FOR SCREENING: Primary | ICD-10-CM

## 2024-01-16 LAB
BREATH ALCOHOL: 0
COLLECTION ONLY: NORMAL

## 2024-01-16 PROCEDURE — 80305 DRUG TEST PRSMV DIR OPT OBS: CPT | Mod: S$GLB,,, | Performed by: NURSE PRACTITIONER

## 2024-01-16 PROCEDURE — 82075 ASSAY OF BREATH ETHANOL: CPT | Mod: S$GLB,,, | Performed by: NURSE PRACTITIONER

## 2024-01-18 ENCOUNTER — OFFICE VISIT (OUTPATIENT)
Dept: NEUROLOGY | Facility: CLINIC | Age: 44
End: 2024-01-18
Payer: MEDICAID

## 2024-01-18 ENCOUNTER — LAB VISIT (OUTPATIENT)
Dept: LAB | Facility: OTHER | Age: 44
End: 2024-01-18
Attending: STUDENT IN AN ORGANIZED HEALTH CARE EDUCATION/TRAINING PROGRAM
Payer: MEDICAID

## 2024-01-18 VITALS
WEIGHT: 269 LBS | SYSTOLIC BLOOD PRESSURE: 128 MMHG | HEART RATE: 78 BPM | DIASTOLIC BLOOD PRESSURE: 88 MMHG | BODY MASS INDEX: 37.66 KG/M2 | HEIGHT: 71 IN

## 2024-01-18 DIAGNOSIS — R41.9 COGNITIVE COMPLAINTS: ICD-10-CM

## 2024-01-18 DIAGNOSIS — R41.9 COGNITIVE COMPLAINTS: Primary | ICD-10-CM

## 2024-01-18 DIAGNOSIS — G44.329 CHRONIC POST-TRAUMATIC HEADACHE, NOT INTRACTABLE: ICD-10-CM

## 2024-01-18 DIAGNOSIS — S06.9X9S TRAUMATIC BRAIN INJURY WITH LOSS OF CONSCIOUSNESS, SEQUELA: ICD-10-CM

## 2024-01-18 PROBLEM — Z77.29 EXPOSURE TO POTENTIALLY HAZARDOUS SUBSTANCE: Status: ACTIVE | Noted: 2024-01-18

## 2024-01-18 PROBLEM — F32.A DEPRESSION, UNSPECIFIED: Status: ACTIVE | Noted: 2024-01-18

## 2024-01-18 PROBLEM — G47.30 SLEEP APNEA, UNSPECIFIED: Status: ACTIVE | Noted: 2024-01-18

## 2024-01-18 LAB
AMMONIA PLAS-SCNC: 46 UMOL/L (ref 10–50)
FOLATE SERPL-MCNC: 12.5 NG/ML (ref 4–24)
VIT B12 SERPL-MCNC: 489 PG/ML (ref 210–950)

## 2024-01-18 PROCEDURE — 82300 ASSAY OF CADMIUM: CPT | Performed by: STUDENT IN AN ORGANIZED HEALTH CARE EDUCATION/TRAINING PROGRAM

## 2024-01-18 PROCEDURE — 3008F BODY MASS INDEX DOCD: CPT | Mod: CPTII,,, | Performed by: STUDENT IN AN ORGANIZED HEALTH CARE EDUCATION/TRAINING PROGRAM

## 2024-01-18 PROCEDURE — 82607 VITAMIN B-12: CPT | Performed by: STUDENT IN AN ORGANIZED HEALTH CARE EDUCATION/TRAINING PROGRAM

## 2024-01-18 PROCEDURE — 99999 PR PBB SHADOW E&M-EST. PATIENT-LVL II: CPT | Mod: PBBFAC,,, | Performed by: STUDENT IN AN ORGANIZED HEALTH CARE EDUCATION/TRAINING PROGRAM

## 2024-01-18 PROCEDURE — 82140 ASSAY OF AMMONIA: CPT | Performed by: STUDENT IN AN ORGANIZED HEALTH CARE EDUCATION/TRAINING PROGRAM

## 2024-01-18 PROCEDURE — 84252 ASSAY OF VITAMIN B-2: CPT | Performed by: STUDENT IN AN ORGANIZED HEALTH CARE EDUCATION/TRAINING PROGRAM

## 2024-01-18 PROCEDURE — 1160F RVW MEDS BY RX/DR IN RCRD: CPT | Mod: CPTII,,, | Performed by: STUDENT IN AN ORGANIZED HEALTH CARE EDUCATION/TRAINING PROGRAM

## 2024-01-18 PROCEDURE — 3079F DIAST BP 80-89 MM HG: CPT | Mod: CPTII,,, | Performed by: STUDENT IN AN ORGANIZED HEALTH CARE EDUCATION/TRAINING PROGRAM

## 2024-01-18 PROCEDURE — 1159F MED LIST DOCD IN RCRD: CPT | Mod: CPTII,,, | Performed by: STUDENT IN AN ORGANIZED HEALTH CARE EDUCATION/TRAINING PROGRAM

## 2024-01-18 PROCEDURE — 99212 OFFICE O/P EST SF 10 MIN: CPT | Mod: PBBFAC | Performed by: STUDENT IN AN ORGANIZED HEALTH CARE EDUCATION/TRAINING PROGRAM

## 2024-01-18 PROCEDURE — 3074F SYST BP LT 130 MM HG: CPT | Mod: CPTII,,, | Performed by: STUDENT IN AN ORGANIZED HEALTH CARE EDUCATION/TRAINING PROGRAM

## 2024-01-18 PROCEDURE — 84425 ASSAY OF VITAMIN B-1: CPT | Performed by: STUDENT IN AN ORGANIZED HEALTH CARE EDUCATION/TRAINING PROGRAM

## 2024-01-18 PROCEDURE — 99203 OFFICE O/P NEW LOW 30 MIN: CPT | Mod: S$PBB,,, | Performed by: STUDENT IN AN ORGANIZED HEALTH CARE EDUCATION/TRAINING PROGRAM

## 2024-01-18 PROCEDURE — 82746 ASSAY OF FOLIC ACID SERUM: CPT | Performed by: STUDENT IN AN ORGANIZED HEALTH CARE EDUCATION/TRAINING PROGRAM

## 2024-01-20 LAB
ARSENIC BLD-MCNC: 1 NG/ML
CADMIUM BLD-MCNC: <0.2 NG/ML
CITY: NORMAL
COUNTY: NORMAL
GUARDIAN FIRST NAME: NORMAL
GUARDIAN LAST NAME: NORMAL
HOME PHONE: NORMAL
LEAD BLD-MCNC: <1 MCG/DL
MERCURY BLD-MCNC: <1 NG/ML
RACE: NORMAL
STATE: NORMAL
STREET ADDRESS: NORMAL
VENOUS/CAPILLARY: NORMAL
ZIP: NORMAL

## 2024-01-23 LAB
VIT B1 BLD-MCNC: 78 UG/L (ref 38–122)
VIT B2 SERPL-MCNC: 6 MCG/L (ref 1–19)

## 2024-01-31 ENCOUNTER — TELEPHONE (OUTPATIENT)
Dept: NEUROLOGY | Facility: CLINIC | Age: 44
End: 2024-01-31
Payer: MEDICAID

## 2024-01-31 DIAGNOSIS — S06.9X9S TRAUMATIC BRAIN INJURY WITH LOSS OF CONSCIOUSNESS, SEQUELA: Primary | ICD-10-CM

## 2024-01-31 DIAGNOSIS — G44.329 CHRONIC POST-TRAUMATIC HEADACHE, NOT INTRACTABLE: ICD-10-CM

## 2024-01-31 DIAGNOSIS — R41.9 COGNITIVE COMPLAINTS: ICD-10-CM

## 2024-02-01 NOTE — TELEPHONE ENCOUNTER
----- Message from Kirby Márquez MA sent at 1/31/2024 11:49 AM CST -----    ----- Message -----  From: Mei Rothman MA  Sent: 1/31/2024  11:28 AM CST  To: Kirby Márquez MA    This is the message you sent me.Dr Bhatia does not have anything soon. Will try to schedule him but will need a referral for physical medicine and rehab. Can;t see him with a referral for neurology. .  Can you please assist me with getting this pt schedule with Dr. Bhatia. Dr. Soto and Dr. Ashby think that Dr. Bhatia will be able to assist the pt. The pt have TBI and Dr. Soto do not treat that condition. Thanks in advance    ----- Message -----  From: Kirby Márquez MA  Sent: 1/31/2024  11:18 AM CST  To: Mei Rothman MA    Good morning     Can you please clarify what you are trying to say? Was this suppose to go to ?     Kirby   ----- Message -----  From: Mei Rothman MA  Sent: 1/30/2024  12:40 PM CST  To: Kirby Márquez MA    [facility] Can you see him? If so I need them to put a referral in for PM&R. You don't have anything soon.    ----- Message -----  From: Kirby Márquez MA  Sent: 1/25/2024   9:54 AM CST  To: Heide Soto MD; Sriram Powers    Good morning     Can you please assist me with getting this pt schedule with Dr. Bhatia. Dr. Soto and Dr. Ashby think that Dr. Bhatia will be able to assist the pt. The pt have TBI and Dr. Soto do not treat that condition. Thanks in advance     Kirby   Clinical Supervisor   ----- Message -----  From: Heide Soto MD  Sent: 1/25/2024   9:48 AM CST  To: Kirby Márquez MA    Can we please get him in with Dr. Bhatia then? If he says no, then [facility] I agree with Dr. Ashby and I'm confused why they didn't help him with that.    ----- Message -----  From: Kirby Márquez MA  Sent: 1/23/2024   9:34 AM CST  To: Heide Soto MD    Good morning     Please see Dr. Ashby response below about the  with the TBI   ----- Message -----  From: Trista Ashby MD  Sent: 1/23/2024   9:26 AM CST  To: RHIANNON Schulz,  Reviewing this patient's history and his  background I would strongly recommend he pursue care through the [facility]. If he prefers to see a civilian provider, please refer him to Dr. Trenton Bhatia. Thank you.  kS  ----- Message -----  From: Kirby Márquez MA  Sent: 1/18/2024  11:13 AM CST  To: Trista Ashby MD    Good morning     I hope that you are staying warm..... Dr. Soto wanted me to ask you if you will be willing to see this pt with TBI. He was seen in the clinic today. The pt have his medical records as well as a MRI CD. Please let me know if you can or do she have to refer him to [facility]?    Thanks   Kirby

## 2024-02-06 ENCOUNTER — TELEPHONE (OUTPATIENT)
Dept: PHYSICAL MEDICINE AND REHAB | Facility: CLINIC | Age: 44
End: 2024-02-06
Payer: MEDICAID

## 2024-02-06 NOTE — TELEPHONE ENCOUNTER
----- Message from Brendan Bhatia MD sent at 2/6/2024 11:33 AM CST -----  Yes    ----- Message -----  From: Mei Rothman MA  Sent: 2/6/2024  10:21 AM CST  To: Brendan Bhatia MD    Can you see this patient? [facility]  see [facility].    ----- Message -----  From: Heide Soto MD  Sent: 1/31/2024   9:28 PM CST  To: Mei Rothman MA; Kirby Márquez MA    Ok I put in a referral for Dr. Bhatia.    ----- Message -----  From: Kirby Márquez MA  Sent: 1/31/2024  11:49 AM CST  To: Heide Soto MD      ----- Message -----  From: Mei Rothman MA  Sent: 1/31/2024  11:28 AM CST  To: Kirby Márquez MA    This is the message you sent me.Dr Bhatia does not have anything soon. Will try to schedule him but will need a referral for physical medicine and rehab. Can;t see him with a referral for neurology. .  Can you please assist me with getting this pt schedule with Dr. Bhatia. Dr. Soto and Dr. Ashby think that Dr. Bhatia will be able to assist the pt. The pt have TBI and Dr. Soto do not treat that condition. Thanks in advance    ----- Message -----  From: Kirby Márquez MA  Sent: 1/31/2024  11:18 AM CST  To: Mei Rothman MA    Good morning     Can you please clarify what you are trying to say? Was this suppose to go to ?     Kirby   ----- Message -----  From: Mei Rothman MA  Sent: 1/30/2024  12:40 PM CST  To: Kirby Márquez MA    See [facility] below [facility]. Can you see him? If so I need them to put a referral in for PM&R. You don't have anything soon.    ----- Message -----  From: Kirby Márquez MA  Sent: 1/25/2024   9:54 AM CST  To: Heide Soto MD; Sriram Cardona Staff    Good morning     Can you please assist me with getting this pt schedule with Dr. Bhatia. Dr. Soto and Dr. Ashby think that Dr. Bhatia will be able to assist the pt. The pt have TBI and Dr. Soto do not treat that condition. Thanks in advance     Kirby   Clinical Supervisor    ----- Message -----  From: Heide Soto MD  Sent: 1/25/2024   9:48 AM CST  To: Kirby Márquez MA    Can we please get him in with Dr. Bhatia then? If he says no, then [facility]. I agree with Dr. Ashby and I'm confused why they didn't help him with that.    ----- Message -----  From: Kirby Márquez MA  Sent: 1/23/2024   9:34 AM CST  To: Heide Soto MD    Good morning     Please see Dr. Ashby response below about the with the TBI   ----- Message -----  From: Trista Ashby MD  Sent: 1/23/2024   9:26 AM CST  To: RHIANNON Schulz,  Reviewing this patient's history and his  background I would strongly recommend he pursue care through the [facility]. If he prefers to see a civilian provider, please refer him to Dr. Trenton Bhatia. Thank you.  kS  ----- Message -----  From: Kirby Márquez MA  Sent: 1/18/2024  11:13 AM CST  To: Trista Ashby MD    Good morning     I hope that you are staying warm..... Dr. Soto wanted me to ask you if you will be willing to see this pt with TBI. He was seen in the clinic today. The pt have his medical records as well as a MRI CD. Please let me know if you can or do she have to refer him to [facility]?    Thanks   Kirby

## 2024-02-06 NOTE — TELEPHONE ENCOUNTER
Spoke to the patient to let him know soonest appt for Dr Bhatia was in June. He accepted the appt. I also added him  to the wait list.

## 2024-10-07 ENCOUNTER — TELEPHONE (OUTPATIENT)
Dept: UROLOGY | Facility: CLINIC | Age: 44
End: 2024-10-07
Payer: OTHER GOVERNMENT

## 2024-10-07 NOTE — TELEPHONE ENCOUNTER
Call placed to pt regarding message to office for infertility consult. Left VM with clinic #.     ----- Message from Nurse Al sent at 10/4/2024  4:54 PM CDT -----    ----- Message -----  From: Astrid Aleixs  Sent: 10/4/2024   4:49 PM CDT  To: Warner ROSALES Staff    Good Afternoon,     The [facility] would like to refer the following patient for Infertility. The patients diagnosis is  Male infertility, unspecified . I have scanned the patients referral and records into . Please schedule         Thank you,    Astrid   [facility]

## 2024-10-15 ENCOUNTER — PATIENT MESSAGE (OUTPATIENT)
Dept: UROLOGY | Facility: CLINIC | Age: 44
End: 2024-10-15
Payer: OTHER GOVERNMENT

## 2024-10-28 ENCOUNTER — OFFICE VISIT (OUTPATIENT)
Dept: UROLOGY | Facility: CLINIC | Age: 44
End: 2024-10-28
Payer: OTHER GOVERNMENT

## 2024-10-28 VITALS
HEIGHT: 71 IN | HEART RATE: 91 BPM | DIASTOLIC BLOOD PRESSURE: 75 MMHG | WEIGHT: 268.94 LBS | SYSTOLIC BLOOD PRESSURE: 136 MMHG | BODY MASS INDEX: 37.65 KG/M2

## 2024-10-28 DIAGNOSIS — N50.9 DISORDER OF MALE GENITAL ORGANS, UNSPECIFIED: Primary | ICD-10-CM

## 2024-10-28 PROBLEM — Z77.29 EXPOSURE TO POTENTIALLY HAZARDOUS SUBSTANCE: Status: RESOLVED | Noted: 2024-01-18 | Resolved: 2024-10-28

## 2024-10-28 PROBLEM — K80.00 CALCULUS OF GALLBLADDER WITH ACUTE CHOLECYSTITIS WITHOUT OBSTRUCTION: Status: RESOLVED | Noted: 2023-12-04 | Resolved: 2024-10-28

## 2024-10-28 PROCEDURE — 99204 OFFICE O/P NEW MOD 45 MIN: CPT | Mod: S$PBB,,, | Performed by: UROLOGY

## 2024-10-28 PROCEDURE — G2211 COMPLEX E/M VISIT ADD ON: HCPCS | Mod: S$PBB,,, | Performed by: UROLOGY

## 2024-10-28 PROCEDURE — 99213 OFFICE O/P EST LOW 20 MIN: CPT | Mod: PBBFAC | Performed by: UROLOGY

## 2024-10-28 PROCEDURE — 99999 PR PBB SHADOW E&M-EST. PATIENT-LVL III: CPT | Mod: PBBFAC,,, | Performed by: UROLOGY

## 2024-10-28 RX ORDER — CLOMIPHENE CITRATE 50 MG/1
50 TABLET ORAL
COMMUNITY
Start: 2024-06-21 | End: 2024-10-28

## 2024-10-28 RX ORDER — CHOLECALCIFEROL (VITAMIN D3) 50 MCG
50 TABLET ORAL
COMMUNITY
Start: 2024-05-24

## 2024-10-28 RX ORDER — CITALOPRAM 40 MG/1
20 TABLET, FILM COATED ORAL
COMMUNITY
Start: 2024-07-10

## 2024-10-28 RX ORDER — ROSUVASTATIN CALCIUM 10 MG/1
5 TABLET, COATED ORAL
COMMUNITY
Start: 2024-09-04

## 2024-10-28 RX ORDER — TADALAFIL 20 MG/1
20 TABLET ORAL
COMMUNITY
Start: 2024-05-03

## 2024-11-04 ENCOUNTER — PATIENT MESSAGE (OUTPATIENT)
Dept: RESEARCH | Facility: HOSPITAL | Age: 44
End: 2024-11-04
Payer: OTHER GOVERNMENT

## 2024-11-13 ENCOUNTER — PATIENT MESSAGE (OUTPATIENT)
Dept: RESEARCH | Facility: HOSPITAL | Age: 44
End: 2024-11-13
Payer: OTHER GOVERNMENT

## 2024-11-30 ENCOUNTER — LAB VISIT (OUTPATIENT)
Dept: LAB | Facility: HOSPITAL | Age: 44
End: 2024-11-30
Attending: UROLOGY
Payer: OTHER GOVERNMENT

## 2024-11-30 DIAGNOSIS — N50.9 DISORDER OF MALE GENITAL ORGANS, UNSPECIFIED: ICD-10-CM

## 2024-11-30 LAB
ESTRADIOL SERPL-MCNC: 27 PG/ML (ref 11–44)
FSH SERPL-ACNC: 9.31 MIU/ML (ref 0.95–11.95)
LH SERPL-ACNC: 2.8 MIU/ML (ref 0.6–12.1)
PROLACTIN SERPL IA-MCNC: 5.9 NG/ML (ref 3.5–19.4)
TESTOST SERPL-MCNC: 278 NG/DL (ref 304–1227)

## 2024-11-30 PROCEDURE — 83001 ASSAY OF GONADOTROPIN (FSH): CPT | Performed by: UROLOGY

## 2024-11-30 PROCEDURE — 82670 ASSAY OF TOTAL ESTRADIOL: CPT | Performed by: UROLOGY

## 2024-11-30 PROCEDURE — 84403 ASSAY OF TOTAL TESTOSTERONE: CPT | Performed by: UROLOGY

## 2024-11-30 PROCEDURE — 83002 ASSAY OF GONADOTROPIN (LH): CPT | Performed by: UROLOGY

## 2024-11-30 PROCEDURE — 84146 ASSAY OF PROLACTIN: CPT | Performed by: UROLOGY

## 2024-11-30 PROCEDURE — 36415 COLL VENOUS BLD VENIPUNCTURE: CPT | Mod: PO | Performed by: UROLOGY

## 2024-12-09 ENCOUNTER — PATIENT MESSAGE (OUTPATIENT)
Dept: UROLOGY | Facility: CLINIC | Age: 44
End: 2024-12-09
Payer: OTHER GOVERNMENT

## 2024-12-11 ENCOUNTER — OFFICE VISIT (OUTPATIENT)
Dept: UROLOGY | Facility: CLINIC | Age: 44
End: 2024-12-11
Payer: OTHER GOVERNMENT

## 2024-12-11 VITALS
HEIGHT: 71 IN | SYSTOLIC BLOOD PRESSURE: 140 MMHG | BODY MASS INDEX: 39.2 KG/M2 | DIASTOLIC BLOOD PRESSURE: 89 MMHG | WEIGHT: 280 LBS | HEART RATE: 85 BPM

## 2024-12-11 DIAGNOSIS — E29.1 TESTICULAR FAILURE: Primary | ICD-10-CM

## 2024-12-11 PROCEDURE — 99999 PR PBB SHADOW E&M-EST. PATIENT-LVL III: CPT | Mod: PBBFAC,,, | Performed by: UROLOGY

## 2024-12-11 PROCEDURE — 99213 OFFICE O/P EST LOW 20 MIN: CPT | Mod: PBBFAC | Performed by: UROLOGY

## 2024-12-11 RX ORDER — CLOMIPHENE CITRATE 50 MG/1
TABLET ORAL
Qty: 15 TABLET | Refills: 5 | Status: SHIPPED | OUTPATIENT
Start: 2024-12-11

## 2024-12-11 NOTE — PROGRESS NOTES
HISTORY OF PRESENT ILLNESS:    Mr. Flores is a 44 y.o. male who has been  to his wife for the last 1 years. Mr. Flores underwent an elective bilateral vasectomy in 2011 without complications. Prior to that he had achieved 5 pregnancies without difficulty. The couple is now interested in achieving a pregnancy.    He's abused anabolic steroids in the past.  Last used > 10 years ago.    He's s/p bilateral V-V by 's partner in 2/24.  Post op SA originally showed sperm.  SA in 8/24 showed azoospermia.  This is per his wife.  I do not have these records.  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    Lab Results   Component Value Date    TOTALTESTOST 278 (L) 11/30/2024    LABLH 2.8 11/30/2024    FSH 9.31 11/30/2024    ESTRADIOL 27 11/30/2024    PROLACTIN 5.9 11/30/2024     SA 12/9/24 ([facility])--3.5 cc/1.2 million per cc/61%/2%    FOR REVIEW FROM PREVIOUS:    Mr. Flores is a 44 y.o. male who has been  to his wife for the last 1 years. Mr. Flores underwent an elective bilateral vasectomy in 2011 without complications. Prior to that he had achieved 5 pregnancies without difficulty. The couple is now interested in achieving a pregnancy.    He's abused anabolic steroids in the past.  Last used > 10 years ago.    He's s/p bilateral V-V by 's partner in 2/24.  Post op SA originally showed sperm.  Last SA in 8/24 showed azoospermia.  This is per his wife.  I do not have these records.    He is on clomid 50 mg QOD.  [facility]    Felicita Flores is 40 years old. She has regular menses and has had 6 prior pregnancies. 4 live birth.  1 stillborn.  1 miscarriage.   She sees Dr. Ackerman.    She has no other underlying gynecological problems.    The couple has never undergone in-vitro fertilization (IVF), intrauterine insemination (IUI), or other assisted reproductive techniques.    Mr. Flores denies any history of exposure to harmful chemicals, toxins, or radiation. No history of  urological trauma or injuries. No history of post-pubertal mumps, testicular torsion, epididymitis, prostatitis, or sexually transmitted diseases.    REVIEW OF SYSTEMS:    Otherwise, the patient denies headache, blurred vision, fever, nausea, vomiting, chills, abdominal pain, chest pain, sore throat, bleeding per rectum, cough, SOB, recent loss of consciousness, recent mental status changes, seizures, dizziness, or upper or lower extremity weakness.    PATIENT HISTORY:    Past Medical History:   Diagnosis Date    Mixed hyperlipidemia        Past Surgical History:   Procedure Laterality Date    LAPAROSCOPIC CHOLECYSTECTOMY N/A 12/4/2023    Procedure: CHOLECYSTECTOMY, LAPAROSCOPIC;  Surgeon: Alicia Rothman MD;  Location: [facility];  Service: General;  Laterality: N/A;       Social History     Socioeconomic History    Marital status:    Tobacco Use    Smoking status: Never    Smokeless tobacco: Never   Substance and Sexual Activity    Alcohol use: Not Currently     Social Drivers of Health     Financial Resource Strain: Medium Risk (6/8/2024)    Overall Financial Resource Strain (CARDIA)     Difficulty of Paying Living Expenses: Somewhat hard   Food Insecurity: Food Insecurity Present (6/8/2024)    Hunger Vital Sign     Worried About Running Out of Food in the Last Year: Sometimes true     Ran Out of Food in the Last Year: Sometimes true   Physical Activity: Insufficiently Active (6/8/2024)    Exercise Vital Sign     Days of Exercise per Week: 2 days     Minutes of Exercise per Session: 60 min   Stress: Stress Concern Present (6/8/2024)    [employer] - Occupational Stress Questionnaire     Feeling of Stress : Very much   Housing Stability: Unknown (6/8/2024)    Housing Stability Vital Sign     Unable to Pay for Housing in the Last Year: No       No family history on file.    Review of patient's allergies indicates:   Allergen Reactions    Morphine Hives and Rash         Current  Outpatient Medications:     cholecalciferol, vitamin D3, (VITAMIN D3) 50 mcg (2,000 unit) Tab, Take 50 mcg by mouth., Disp: , Rfl:     citalopram (CELEXA) 40 MG tablet, Take 20 mg by mouth., Disp: , Rfl:     fluticasone propionate (FLONASE) 50 mcg/actuation nasal spray, 1 spray (50 mcg total) by Each Nostril route once daily., Disp: 15.8 mL, Rfl: 0    rosuvastatin (CRESTOR) 10 MG tablet, Take 5 mg by mouth., Disp: , Rfl:     tadalafiL (CIALIS) 20 MG Tab, Take 20 mg by mouth., Disp: , Rfl:       PHYSICAL EXAM:    The patient generally appears in good health, is appropriately interactive, and is in no apparent distress.     Eyes: anicteric sclerae, moist conjunctivae; no lid-lag; PERRLA     HENT: Atraumatic; oropharynx clear with moist mucous membranes and no mucosal ulcerations;normal hard and soft palate.  No evidence of lymphadenopathy.    Neck: Trachea midline.  No thyromegaly.    Skin: No lesions.    Mental: Cooperative with normal affect.  Is oriented to time, place, and person.    Neuro: Grossly intact.    Chest: Normal inspiratory effort.   No accessory muscles.  No audible wheezes.  Respirations symmetric on inspiration and expiration.    Heart: Regular rhythm.      Abdomen:  Soft, non-tender. No masses or organomegaly. Bladder is not palpable. No evidence of flank discomfort. No evidence of inguinal hernia.    Genitourinary: Penis is normal with no evidence of plaques or induration. Urethral meatus is normal. Scrotum is normal. Testes are descended bilaterally with no evidence of abnormal masses or tenderness. Testicular volume is ~ 17 cc on the R and 18 cc on the L.    Extremities: No cyanosis, clubbing, or edema.    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 Independently interpreted his labs and outside SA's today.   2.  From a male factor perspective efforts with IVF are most appropriate.  3.  Discussed that his T is low.  Will start clomid. Side effects discussed.  A new  Rx was given.  Will check a T in 1 month.  4.  RTC 6 months if not pregnant by then.  5. Visit today included increased complexity associated with the care of the episodic problem of severe male factor infertility addressed and managing the longitudinal care of the patient due to the serious and/or complex managed problem(s) .

## 2025-01-27 ENCOUNTER — LAB VISIT (OUTPATIENT)
Dept: LAB | Facility: OTHER | Age: 45
End: 2025-01-27
Attending: UROLOGY
Payer: OTHER GOVERNMENT

## 2025-01-27 DIAGNOSIS — E29.1 TESTICULAR FAILURE: ICD-10-CM

## 2025-01-27 LAB
ESTRADIOL SERPL-MCNC: 19 PG/ML (ref 11–44)
TESTOST SERPL-MCNC: 434 NG/DL (ref 304–1227)

## 2025-01-27 PROCEDURE — 84403 ASSAY OF TOTAL TESTOSTERONE: CPT | Performed by: UROLOGY

## 2025-01-27 PROCEDURE — 36415 COLL VENOUS BLD VENIPUNCTURE: CPT | Performed by: UROLOGY

## 2025-01-27 PROCEDURE — 82670 ASSAY OF TOTAL ESTRADIOL: CPT | Performed by: UROLOGY

## 2025-01-28 ENCOUNTER — TELEPHONE (OUTPATIENT)
Dept: UROLOGY | Facility: CLINIC | Age: 45
End: 2025-01-28
Payer: OTHER GOVERNMENT

## 2025-01-28 ENCOUNTER — PATIENT MESSAGE (OUTPATIENT)
Dept: UROLOGY | Facility: CLINIC | Age: 45
End: 2025-01-28
Payer: OTHER GOVERNMENT

## 2025-01-28 NOTE — TELEPHONE ENCOUNTER
Call was placed back to patient no answer left VM. Will reach pout through [employer].     Please notify T looks good.  Continue clomid.  RTC as scheduled.

## 2025-07-22 ENCOUNTER — OFFICE VISIT (OUTPATIENT)
Dept: OTOLARYNGOLOGY | Facility: CLINIC | Age: 45
End: 2025-07-22
Payer: MEDICAID

## 2025-07-22 VITALS — BODY MASS INDEX: 39.9 KG/M2 | WEIGHT: 285 LBS | HEIGHT: 71 IN

## 2025-07-22 DIAGNOSIS — J34.2 DEVIATED NASAL SEPTUM: Primary | ICD-10-CM

## 2025-07-22 DIAGNOSIS — J34.3 HYPERTROPHY OF INFERIOR NASAL TURBINATE: ICD-10-CM

## 2025-07-22 PROCEDURE — 1160F RVW MEDS BY RX/DR IN RCRD: CPT | Mod: CPTII,,, | Performed by: OTOLARYNGOLOGY

## 2025-07-22 PROCEDURE — 99999 PR PBB SHADOW E&M-EST. PATIENT-LVL III: CPT | Mod: PBBFAC,,, | Performed by: OTOLARYNGOLOGY

## 2025-07-22 PROCEDURE — 1159F MED LIST DOCD IN RCRD: CPT | Mod: CPTII,,, | Performed by: OTOLARYNGOLOGY

## 2025-07-22 PROCEDURE — 99213 OFFICE O/P EST LOW 20 MIN: CPT | Mod: PBBFAC,PN | Performed by: OTOLARYNGOLOGY

## 2025-07-22 PROCEDURE — 99204 OFFICE O/P NEW MOD 45 MIN: CPT | Mod: S$PBB,,, | Performed by: OTOLARYNGOLOGY

## 2025-07-22 PROCEDURE — 3008F BODY MASS INDEX DOCD: CPT | Mod: CPTII,,, | Performed by: OTOLARYNGOLOGY

## 2025-07-22 RX ORDER — EZETIMIBE 10 MG/1
10 TABLET ORAL DAILY
Qty: 90 TABLET | Refills: 3 | Status: SHIPPED | OUTPATIENT
Start: 2025-07-22 | End: 2026-07-22

## 2025-07-22 RX ORDER — AZELASTINE 1 MG/ML
SPRAY, METERED NASAL
Qty: 30 ML | Refills: 11 | Status: SHIPPED | OUTPATIENT
Start: 2025-07-22

## 2025-07-22 NOTE — PROGRESS NOTES
"Subjective:       Patient ID: Max Flores is a 44 y.o. male.    Chief Complaint: Sinus Problem (Patient here with c/o " hard to breathe, I have a CPAP and I have polyps in my nose." )      This 44-year-old presents with a history of long term nasal airway obstruction.  He wears CPAP at night successfully but just during that day especially and when he does not any type of activity which he mentions historically when he did aerobic exercising that he is hoping to get back to he would have trouble breathing through his nose both sides and he has tried Flonase routinely for long time maybe with some benefit but not satisfactory.  Additionally he is having trouble getting refills of his Zetia for his cholesterol that [facility].        Objective:      ENT Physical Exam    He has a big muscularis somewhat overweight lyudmila and he is about to begin some exercise.    His nasal exam shows a dramatic rightward septal deviation almost completely obstructing the right side and on the left side while he has a good bit of room because of the concavity that is significant inferior turbinate hypertrophy.  I do not see any purulence or prominent inflammation.        Assessment:       1. Deviated nasal septum    2. Hypertrophy of inferior nasal turbinate         Plan:          So it is fairly apparent that a deviated septum is the primary issue with the nasal airway.  He has tried some Flonase but I wanted him to try Flonase and azelastine used together sometimes the combination can be much more helpful for some patients.  In the event that these have not prove suitable we did discuss the basics of a septoplasty and turbinate reduction which I think he would be a pretty good candidate for if he does not get relief from the types of medicine that you can use routinely for long periods of time.          "

## (undated) DEVICE — STRIP MEDI WND CLSR 1/2X4IN

## (undated) DEVICE — BLADE SURG CARBON STEEL SZ11

## (undated) DEVICE — ELECTRODE REM PLYHSV RETURN 9

## (undated) DEVICE — SUT MONOCRYL 4-0 SH UND MON

## (undated) DEVICE — TROCAR KII FIOS 12MM X 100MM

## (undated) DEVICE — GOWN POLY REINF BRTH SLV XL

## (undated) DEVICE — CLOSURE SKIN STERI STRIP 1/2X4

## (undated) DEVICE — APPLIER CLIP EPIX UNIV 5X34

## (undated) DEVICE — SET TUBE PNEUMOCLEAR SE HI FLO

## (undated) DEVICE — SCISSOR 5MMX35CM DIRECT DRIVE

## (undated) DEVICE — TROCAR KII FIOS 5MM X 100MM

## (undated) DEVICE — APPLICATOR CHLORAPREP ORN 26ML

## (undated) DEVICE — SOL CLEARIFY VISUALIZATION LAP

## (undated) DEVICE — LINER SUCTION 3000CC

## (undated) DEVICE — SYR 30CC LUER LOCK

## (undated) DEVICE — BAG TISS RETRV MONARCH 10MM

## (undated) DEVICE — NDL SPINAL 18GX3.5 SPINOCAN

## (undated) DEVICE — GLOVE SENSICARE PI ALOE 6.5

## (undated) DEVICE — SLEEVE SCD EXPRESS KNEE MEDIUM

## (undated) DEVICE — GLOVE SENSICARE PI ALOE 7.5

## (undated) DEVICE — GLOVE SENSICARE PI GRN 7

## (undated) DEVICE — STRAP OR TABLE 5IN X 72IN

## (undated) DEVICE — PACK CUSTOM ENDO CHOLO SLI

## (undated) DEVICE — DISSECTOR CURVED 5DCD

## (undated) DEVICE — TOWEL OR DISP STRL BLUE 4/PK

## (undated) DEVICE — SUT 0 VICRYL / UR6 (J603)